# Patient Record
Sex: FEMALE | Race: WHITE | Employment: UNEMPLOYED | URBAN - METROPOLITAN AREA
[De-identification: names, ages, dates, MRNs, and addresses within clinical notes are randomized per-mention and may not be internally consistent; named-entity substitution may affect disease eponyms.]

---

## 2017-01-06 ENCOUNTER — TELEPHONE (OUTPATIENT)
Dept: FAMILY MEDICINE CLINIC | Age: 82
End: 2017-01-06

## 2017-01-06 DIAGNOSIS — E03.9 HYPOTHYROIDISM, UNSPECIFIED TYPE: ICD-10-CM

## 2017-01-06 DIAGNOSIS — E78.5 HYPERLIPIDEMIA, UNSPECIFIED HYPERLIPIDEMIA TYPE: Primary | ICD-10-CM

## 2017-01-11 DIAGNOSIS — E03.9 HYPOTHYROIDISM, UNSPECIFIED TYPE: ICD-10-CM

## 2017-01-11 DIAGNOSIS — E78.5 HYPERLIPIDEMIA, UNSPECIFIED HYPERLIPIDEMIA TYPE: ICD-10-CM

## 2017-01-11 LAB
A/G RATIO: 1.1 (ref 1.1–2.2)
ALBUMIN SERPL-MCNC: 3.9 G/DL (ref 3.4–5)
ALP BLD-CCNC: 92 U/L (ref 40–129)
ALT SERPL-CCNC: 15 U/L (ref 10–40)
ANION GAP SERPL CALCULATED.3IONS-SCNC: 13 MMOL/L (ref 3–16)
AST SERPL-CCNC: 20 U/L (ref 15–37)
BILIRUB SERPL-MCNC: 0.4 MG/DL (ref 0–1)
BUN BLDV-MCNC: 14 MG/DL (ref 7–20)
CALCIUM SERPL-MCNC: 9.7 MG/DL (ref 8.3–10.6)
CHLORIDE BLD-SCNC: 105 MMOL/L (ref 99–110)
CHOLESTEROL, TOTAL: 164 MG/DL (ref 0–199)
CO2: 27 MMOL/L (ref 21–32)
CREAT SERPL-MCNC: 0.6 MG/DL (ref 0.6–1.2)
GFR AFRICAN AMERICAN: >60
GFR NON-AFRICAN AMERICAN: >60
GLOBULIN: 3.7 G/DL
GLUCOSE BLD-MCNC: 89 MG/DL (ref 70–99)
HDLC SERPL-MCNC: 64 MG/DL (ref 40–60)
LDL CHOLESTEROL CALCULATED: 79 MG/DL
POTASSIUM SERPL-SCNC: 4 MMOL/L (ref 3.5–5.1)
SODIUM BLD-SCNC: 145 MMOL/L (ref 136–145)
TOTAL PROTEIN: 7.6 G/DL (ref 6.4–8.2)
TRIGL SERPL-MCNC: 105 MG/DL (ref 0–150)
TSH REFLEX: 0.93 UIU/ML (ref 0.27–4.2)
VLDLC SERPL CALC-MCNC: 21 MG/DL

## 2017-01-19 ENCOUNTER — OFFICE VISIT (OUTPATIENT)
Dept: FAMILY MEDICINE CLINIC | Age: 82
End: 2017-01-19

## 2017-01-19 VITALS
HEIGHT: 63 IN | WEIGHT: 156.6 LBS | TEMPERATURE: 97.7 F | BODY MASS INDEX: 27.75 KG/M2 | SYSTOLIC BLOOD PRESSURE: 118 MMHG | HEART RATE: 76 BPM | DIASTOLIC BLOOD PRESSURE: 80 MMHG

## 2017-01-19 DIAGNOSIS — E03.9 ACQUIRED HYPOTHYROIDISM: ICD-10-CM

## 2017-01-19 DIAGNOSIS — E78.2 MIXED HYPERLIPIDEMIA: ICD-10-CM

## 2017-01-19 DIAGNOSIS — I10 ESSENTIAL HYPERTENSION: Primary | ICD-10-CM

## 2017-01-19 PROCEDURE — G8427 DOCREV CUR MEDS BY ELIG CLIN: HCPCS | Performed by: FAMILY MEDICINE

## 2017-01-19 PROCEDURE — 4040F PNEUMOC VAC/ADMIN/RCVD: CPT | Performed by: FAMILY MEDICINE

## 2017-01-19 PROCEDURE — G8420 CALC BMI NORM PARAMETERS: HCPCS | Performed by: FAMILY MEDICINE

## 2017-01-19 PROCEDURE — 99214 OFFICE O/P EST MOD 30 MIN: CPT | Performed by: FAMILY MEDICINE

## 2017-01-19 PROCEDURE — 1090F PRES/ABSN URINE INCON ASSESS: CPT | Performed by: FAMILY MEDICINE

## 2017-01-19 PROCEDURE — G8484 FLU IMMUNIZE NO ADMIN: HCPCS | Performed by: FAMILY MEDICINE

## 2017-01-19 PROCEDURE — 1036F TOBACCO NON-USER: CPT | Performed by: FAMILY MEDICINE

## 2017-01-19 PROCEDURE — 1123F ACP DISCUSS/DSCN MKR DOCD: CPT | Performed by: FAMILY MEDICINE

## 2017-01-19 RX ORDER — HYDROCHLOROTHIAZIDE 25 MG/1
TABLET ORAL
Qty: 90 TABLET | Refills: 3 | Status: SHIPPED | OUTPATIENT
Start: 2017-01-19 | End: 2017-07-27 | Stop reason: SDUPTHER

## 2017-01-19 RX ORDER — AMLODIPINE BESYLATE 5 MG/1
TABLET ORAL
Qty: 90 TABLET | Refills: 3 | Status: SHIPPED | OUTPATIENT
Start: 2017-01-19 | End: 2017-07-27 | Stop reason: SDUPTHER

## 2017-01-19 RX ORDER — LEVOTHYROXINE SODIUM 0.1 MG/1
TABLET ORAL
Qty: 90 TABLET | Refills: 3 | Status: SHIPPED | OUTPATIENT
Start: 2017-01-19 | End: 2017-07-27 | Stop reason: SDUPTHER

## 2017-01-19 RX ORDER — SIMVASTATIN 20 MG
TABLET ORAL
Qty: 90 TABLET | Refills: 3 | Status: SHIPPED | OUTPATIENT
Start: 2017-01-19 | End: 2018-02-09 | Stop reason: SDUPTHER

## 2017-01-19 ASSESSMENT — ENCOUNTER SYMPTOMS
EYE PAIN: 0
NAUSEA: 0
SHORTNESS OF BREATH: 0
VOMITING: 0
ABDOMINAL PAIN: 0
BLOOD IN STOOL: 0
RHINORRHEA: 0
DIARRHEA: 0
EYE DISCHARGE: 0
SINUS PRESSURE: 0
COLOR CHANGE: 0
CONSTIPATION: 0
COUGH: 0
CHEST TIGHTNESS: 0
EYE REDNESS: 0
WHEEZING: 0

## 2017-01-19 ASSESSMENT — PATIENT HEALTH QUESTIONNAIRE - PHQ9
SUM OF ALL RESPONSES TO PHQ QUESTIONS 1-9: 0
SUM OF ALL RESPONSES TO PHQ9 QUESTIONS 1 & 2: 0
2. FEELING DOWN, DEPRESSED OR HOPELESS: 0
1. LITTLE INTEREST OR PLEASURE IN DOING THINGS: 0

## 2017-02-08 ENCOUNTER — NURSE ONLY (OUTPATIENT)
Dept: FAMILY MEDICINE CLINIC | Age: 82
End: 2017-02-08

## 2017-02-08 VITALS — WEIGHT: 157 LBS | BODY MASS INDEX: 27.81 KG/M2

## 2017-02-08 DIAGNOSIS — R63.4 WEIGHT LOSS: Primary | ICD-10-CM

## 2017-02-08 PROCEDURE — 99999 PR OFFICE/OUTPT VISIT,PROCEDURE ONLY: CPT | Performed by: FAMILY MEDICINE

## 2017-02-08 PROCEDURE — 2001F WEIGHT RECORD: CPT | Performed by: FAMILY MEDICINE

## 2017-02-27 ENCOUNTER — NURSE ONLY (OUTPATIENT)
Dept: FAMILY MEDICINE CLINIC | Age: 82
End: 2017-02-27

## 2017-02-27 VITALS — WEIGHT: 158.6 LBS | BODY MASS INDEX: 28.09 KG/M2

## 2017-02-27 DIAGNOSIS — R63.4 WEIGHT LOSS: Primary | ICD-10-CM

## 2017-02-27 PROCEDURE — 99999 PR OFFICE/OUTPT VISIT,PROCEDURE ONLY: CPT | Performed by: FAMILY MEDICINE

## 2017-02-27 PROCEDURE — 2001F WEIGHT RECORD: CPT | Performed by: FAMILY MEDICINE

## 2017-03-03 ENCOUNTER — TELEPHONE (OUTPATIENT)
Dept: FAMILY MEDICINE CLINIC | Age: 82
End: 2017-03-03

## 2017-04-10 RX ORDER — HYDROCHLOROTHIAZIDE 25 MG/1
TABLET ORAL
Qty: 90 TABLET | Refills: 3 | Status: SHIPPED | OUTPATIENT
Start: 2017-04-10 | End: 2018-06-16 | Stop reason: SDUPTHER

## 2017-05-30 ENCOUNTER — TELEPHONE (OUTPATIENT)
Dept: FAMILY MEDICINE CLINIC | Age: 82
End: 2017-05-30

## 2017-05-30 DIAGNOSIS — B35.1 NAIL FUNGUS: Primary | ICD-10-CM

## 2017-07-12 ENCOUNTER — TELEPHONE (OUTPATIENT)
Dept: FAMILY MEDICINE CLINIC | Age: 82
End: 2017-07-12

## 2017-07-14 RX ORDER — AMLODIPINE BESYLATE 5 MG/1
TABLET ORAL
Qty: 90 TABLET | Refills: 3 | Status: SHIPPED | OUTPATIENT
Start: 2017-07-14 | End: 2018-09-19 | Stop reason: SDUPTHER

## 2017-07-14 RX ORDER — LEVOTHYROXINE SODIUM 0.1 MG/1
TABLET ORAL
Qty: 90 TABLET | Refills: 3 | Status: SHIPPED | OUTPATIENT
Start: 2017-07-14 | End: 2018-06-16 | Stop reason: SDUPTHER

## 2017-07-24 ENCOUNTER — TELEPHONE (OUTPATIENT)
Dept: FAMILY MEDICINE CLINIC | Age: 82
End: 2017-07-24

## 2017-07-24 DIAGNOSIS — E78.2 MIXED HYPERLIPIDEMIA: ICD-10-CM

## 2017-07-24 DIAGNOSIS — Z85.3 HISTORY OF BREAST CANCER: Primary | ICD-10-CM

## 2017-07-24 LAB
A/G RATIO: 1.1 (ref 1.1–2.2)
ALBUMIN SERPL-MCNC: 3.9 G/DL (ref 3.4–5)
ALP BLD-CCNC: 97 U/L (ref 40–129)
ALT SERPL-CCNC: 14 U/L (ref 10–40)
ANION GAP SERPL CALCULATED.3IONS-SCNC: 13 MMOL/L (ref 3–16)
AST SERPL-CCNC: 20 U/L (ref 15–37)
BILIRUB SERPL-MCNC: <0.2 MG/DL (ref 0–1)
BUN BLDV-MCNC: 13 MG/DL (ref 7–20)
CALCIUM SERPL-MCNC: 9.9 MG/DL (ref 8.3–10.6)
CHLORIDE BLD-SCNC: 102 MMOL/L (ref 99–110)
CHOLESTEROL, TOTAL: 143 MG/DL (ref 0–199)
CO2: 26 MMOL/L (ref 21–32)
CREAT SERPL-MCNC: 0.5 MG/DL (ref 0.6–1.2)
GFR AFRICAN AMERICAN: >60
GFR NON-AFRICAN AMERICAN: >60
GLOBULIN: 3.6 G/DL
GLUCOSE BLD-MCNC: 91 MG/DL (ref 70–99)
HDLC SERPL-MCNC: 63 MG/DL (ref 40–60)
LDL CHOLESTEROL CALCULATED: 62 MG/DL
POTASSIUM SERPL-SCNC: 3.9 MMOL/L (ref 3.5–5.1)
SODIUM BLD-SCNC: 141 MMOL/L (ref 136–145)
TOTAL PROTEIN: 7.5 G/DL (ref 6.4–8.2)
TRIGL SERPL-MCNC: 88 MG/DL (ref 0–150)
VLDLC SERPL CALC-MCNC: 18 MG/DL

## 2017-07-27 ENCOUNTER — OFFICE VISIT (OUTPATIENT)
Dept: FAMILY MEDICINE CLINIC | Age: 82
End: 2017-07-27

## 2017-07-27 VITALS
DIASTOLIC BLOOD PRESSURE: 84 MMHG | BODY MASS INDEX: 28.49 KG/M2 | SYSTOLIC BLOOD PRESSURE: 124 MMHG | TEMPERATURE: 97.7 F | HEART RATE: 76 BPM | WEIGHT: 160.8 LBS | HEIGHT: 63 IN

## 2017-07-27 DIAGNOSIS — E78.2 MIXED HYPERLIPIDEMIA: ICD-10-CM

## 2017-07-27 DIAGNOSIS — I10 ESSENTIAL HYPERTENSION: Primary | ICD-10-CM

## 2017-07-27 DIAGNOSIS — E55.9 VITAMIN D DEFICIENCY: ICD-10-CM

## 2017-07-27 DIAGNOSIS — E03.9 ACQUIRED HYPOTHYROIDISM: ICD-10-CM

## 2017-07-27 PROCEDURE — 1090F PRES/ABSN URINE INCON ASSESS: CPT | Performed by: FAMILY MEDICINE

## 2017-07-27 PROCEDURE — 99213 OFFICE O/P EST LOW 20 MIN: CPT | Performed by: FAMILY MEDICINE

## 2017-07-27 PROCEDURE — 4040F PNEUMOC VAC/ADMIN/RCVD: CPT | Performed by: FAMILY MEDICINE

## 2017-07-27 PROCEDURE — G8419 CALC BMI OUT NRM PARAM NOF/U: HCPCS | Performed by: FAMILY MEDICINE

## 2017-07-27 PROCEDURE — 1036F TOBACCO NON-USER: CPT | Performed by: FAMILY MEDICINE

## 2017-07-27 PROCEDURE — 1123F ACP DISCUSS/DSCN MKR DOCD: CPT | Performed by: FAMILY MEDICINE

## 2017-07-27 PROCEDURE — G8427 DOCREV CUR MEDS BY ELIG CLIN: HCPCS | Performed by: FAMILY MEDICINE

## 2017-07-27 ASSESSMENT — ENCOUNTER SYMPTOMS
EYE PAIN: 0
CHEST TIGHTNESS: 0
EYE REDNESS: 0
WHEEZING: 0
SHORTNESS OF BREATH: 0
NAUSEA: 0
RHINORRHEA: 0
COUGH: 0
VOMITING: 0
CONSTIPATION: 0
COLOR CHANGE: 0
EYE DISCHARGE: 0
BLOOD IN STOOL: 0
DIARRHEA: 0
ABDOMINAL PAIN: 0
SINUS PRESSURE: 0

## 2017-07-31 ENCOUNTER — HOSPITAL ENCOUNTER (OUTPATIENT)
Dept: WOMENS IMAGING | Age: 82
Discharge: HOME | End: 2017-07-31
Attending: FAMILY MEDICINE | Admitting: FAMILY MEDICINE

## 2017-07-31 DIAGNOSIS — Z85.3 HISTORY OF BREAST CANCER: ICD-10-CM

## 2017-07-31 DIAGNOSIS — Z85.3 PERSONAL HISTORY OF MALIGNANT NEOPLASM OF BREAST: ICD-10-CM

## 2017-08-01 DIAGNOSIS — Z12.31 VISIT FOR SCREENING MAMMOGRAM: Primary | ICD-10-CM

## 2017-11-13 ENCOUNTER — OFFICE VISIT (OUTPATIENT)
Dept: FAMILY MEDICINE CLINIC | Age: 82
End: 2017-11-13

## 2017-11-13 ENCOUNTER — HOSPITAL ENCOUNTER (OUTPATIENT)
Dept: NON INVASIVE DIAGNOSTICS | Age: 82
Discharge: HOME | End: 2017-11-13
Attending: FAMILY MEDICINE | Admitting: FAMILY MEDICINE

## 2017-11-13 VITALS
BODY MASS INDEX: 28.14 KG/M2 | TEMPERATURE: 97.8 F | DIASTOLIC BLOOD PRESSURE: 82 MMHG | SYSTOLIC BLOOD PRESSURE: 128 MMHG | HEIGHT: 63 IN | HEART RATE: 64 BPM | WEIGHT: 158.8 LBS

## 2017-11-13 DIAGNOSIS — R06.02 SOB (SHORTNESS OF BREATH): ICD-10-CM

## 2017-11-13 DIAGNOSIS — R68.83 CHILLS: ICD-10-CM

## 2017-11-13 DIAGNOSIS — R42 DIZZINESS: ICD-10-CM

## 2017-11-13 DIAGNOSIS — R42 LIGHTHEADEDNESS: Primary | ICD-10-CM

## 2017-11-13 PROCEDURE — 4040F PNEUMOC VAC/ADMIN/RCVD: CPT | Performed by: FAMILY MEDICINE

## 2017-11-13 PROCEDURE — 93000 ELECTROCARDIOGRAM COMPLETE: CPT | Performed by: FAMILY MEDICINE

## 2017-11-13 PROCEDURE — 1123F ACP DISCUSS/DSCN MKR DOCD: CPT | Performed by: FAMILY MEDICINE

## 2017-11-13 PROCEDURE — G8484 FLU IMMUNIZE NO ADMIN: HCPCS | Performed by: FAMILY MEDICINE

## 2017-11-13 PROCEDURE — G8417 CALC BMI ABV UP PARAM F/U: HCPCS | Performed by: FAMILY MEDICINE

## 2017-11-13 PROCEDURE — 1036F TOBACCO NON-USER: CPT | Performed by: FAMILY MEDICINE

## 2017-11-13 PROCEDURE — 1090F PRES/ABSN URINE INCON ASSESS: CPT | Performed by: FAMILY MEDICINE

## 2017-11-13 PROCEDURE — 99213 OFFICE O/P EST LOW 20 MIN: CPT | Performed by: FAMILY MEDICINE

## 2017-11-13 PROCEDURE — G8427 DOCREV CUR MEDS BY ELIG CLIN: HCPCS | Performed by: FAMILY MEDICINE

## 2017-11-13 ASSESSMENT — ENCOUNTER SYMPTOMS
BLOOD IN STOOL: 0
RHINORRHEA: 0
DIARRHEA: 0
EYE PAIN: 0
EYE REDNESS: 0
WHEEZING: 0
ABDOMINAL PAIN: 0
NAUSEA: 0
SINUS PAIN: 0
COUGH: 0
VOMITING: 0
SORE THROAT: 0
SHORTNESS OF BREATH: 0

## 2017-11-13 NOTE — PROGRESS NOTES
Subjective:      Patient ID: Guy Burnette is a 80 y.o. female. HPI     Chief Complaint   Patient presents with    Dizziness     dizziness intermitted x 2 days. states she has inner ear issues. Patient presents c/o decreased appetite x 6 days. States that last Tuesday (11/7/17), she had spicy chicken tortilla soup, and a few hours later \"felt funny\". States she experienced some chills and her  states that she was breathing quicker while sleeping. States she felt fine that Wednesday but then experienced more chills that Thursday night. States she has been eating crackers, toast, orange juice and chocolate milk. States she felt hungry this morning and ate eggs, a croissant, and orange juice. Denies fever, rhinorrhea, congestion, ear pain, eye pain, abdominal pain, N/V/D, chest pain, palpitations, SOB, or wheezing. Patient also states she has had intermittent lightheaded x 6 days. Aggravated with movement and alleviated with rest. States she will feel \"woozy\" but that she feels better when she closes her eyes. Denies blurry vision, dizziness with head movement, syncope, or feeling off balance. Patient states that for the past few months, \"things don't taste like they should\".     Guy Burnette is a 80 y.o. female with the following history as recorded in [facility]:  Patient Active Problem List    Diagnosis Date Noted    History of breast cancer 06/06/2016    Sacral insufficiency fracture 05/18/2013    Shortness of breath 06/08/2012    Abnormal EKG     Atrial arrhythmia     Cataract 06/07/2012    Arrhythmia, atrial 06/07/2012    HTN (hypertension) 05/15/2012    Hypothyroid 11/17/2011    HLD (hyperlipidemia) 11/17/2011    COPD (chronic obstructive pulmonary disease) ([facility] [address] 11/17/2011    Osteopenia 11/17/2011    Abnormal mammogram 11/17/2011     Current Outpatient Prescriptions   Medication Sig Dispense Refill    Umeclidinium Bromide (INCRUSE ELLIPTA) 62.5 MCG/INH AEPB Inhale 1 puff into the lungs daily 3 each 3    levothyroxine (SYNTHROID) 100 MCG tablet TAKE 1 TABLET DAILY 90 tablet 3    amLODIPine (NORVASC) 5 MG tablet TAKE 1 TABLET DAILY 90 tablet 3    hydrochlorothiazide (HYDRODIURIL) 25 MG tablet TAKE 1 TABLET DAILY 90 tablet 3    simvastatin (ZOCOR) 20 MG tablet TAKE 1 TABLET EVERY EVENING 90 tablet 3    aspirin 81 MG tablet Take 81 mg by mouth daily      Ascorbic Acid (VITAMIN C) 500 MG tablet Take 500 mg by mouth daily      Multiple Minerals-Vitamins (CALCIUM & VIT D3 BONE HEALTH PO) Take by mouth daily      Multiple Vitamins-Minerals (MULTIVITAL PO) Take  by mouth. No current facility-administered medications for this visit. Allergies: Review of patient's allergies indicates no known allergies. Past Medical History:   Diagnosis Date    Abnormal EKG     Atrial arrhythmia     Echo 09 LVEF 65-70%    Breast cancer (HCC)     s/p mastectomy    COPD (chronic obstructive pulmonary disease) (HCC)     Hyperlipidemia     managed by PCP    Hypothyroidism     managed by PCP    Preop cardiovascular exam     prior to having cataract surgery     Past Surgical History:   Procedure Laterality Date    MASTECTOMY      OVARY REMOVAL      TONSILLECTOMY AND ADENOIDECTOMY       Family History   Problem Relation Age of Onset    Diabetes Sister     Heart Disease Sister     Heart Disease Brother      Social History   Substance Use Topics    Smoking status: Former Smoker    Smokeless tobacco: Never Used    Alcohol use Yes      Comment: occ     Vitals:    11/13/17 1306   BP: 128/82   Pulse: 64   Temp: 97.8 °F (36.6 °C)   TempSrc: Oral   Weight: 158 lb 12.8 oz (72 kg)   Height: 5' 3\" (1.6 m)     Body mass index is 28.13 kg/m².      Wt Readings from Last 3 Encounters:   11/13/17 158 lb 12.8 oz (72 kg)   07/27/17 160 lb 12.8 oz (72.9 kg)   02/27/17 158 lb 9.6 oz (71.9 kg)     BP Readings from Last 3 Encounters:   11/13/17 128/82   07/27/17 124/84   01/19/17 118/80 Review of Systems   Constitutional: Positive for appetite change. Negative for chills and fever. HENT: Negative for congestion, ear pain, rhinorrhea, sinus pain and sore throat. Eyes: Negative for pain and redness. Respiratory: Negative for cough, shortness of breath and wheezing. Cardiovascular: Negative for chest pain and palpitations. Gastrointestinal: Negative for abdominal pain, blood in stool, diarrhea, nausea and vomiting. Skin: Negative for rash. Neurological: Positive for dizziness and light-headedness. Negative for seizures, syncope, weakness and headaches. Objective:   Physical Exam   Constitutional: She is oriented to person, place, and time. She appears well-developed and well-nourished. HENT:   Head: Normocephalic and atraumatic. Cardiovascular: Normal rate and regular rhythm. Pulmonary/Chest: Effort normal and breath sounds normal. She has no wheezes. Abdominal: Soft. Bowel sounds are normal. She exhibits no distension. There is no tenderness. There is no rebound and no guarding. Neurological: She is alert and oriented to person, place, and time. Skin: Skin is warm and dry. No rash noted. Psychiatric: She has a normal mood and affect.  Her behavior is normal. Judgment and thought content normal.     EKG: sinus rhythm with occasional ectopic ventricular beat    Assessment:      Lightheaded/dizziness - EKG showed sinus rhythm with occasional ectopic ventricular beat, will get MRI of brain  Chills  SOB - XR of chest      Plan:         Orders Placed This Encounter   Procedures    MRI Brain W WO Contrast     Standing Status:   Future     Standing Expiration Date:   11/13/2018     Order Specific Question:   Reason for exam:     Answer:   DIZZINESS X MONTHS    XR CHEST STANDARD (2 VW)     Standing Status:   Future     Standing Expiration Date:   11/13/2018     Order Specific Question:   Reason for exam:     Answer:   SOB, chills    EKG 12 Lead     Standing Status: Future     Number of Occurrences:   1     Standing Expiration Date:   11/13/2018     Order Specific Question:   Reason for Exam?     Answer:   Dizziness     No orders of the defined types were placed in this encounter.

## 2017-11-15 DIAGNOSIS — R93.89 ABNORMAL CHEST X-RAY: ICD-10-CM

## 2017-11-15 DIAGNOSIS — R06.02 SOB (SHORTNESS OF BREATH): Primary | ICD-10-CM

## 2017-11-16 ENCOUNTER — HOSPITAL ENCOUNTER (OUTPATIENT)
Dept: MRI IMAGING | Age: 82
Discharge: HOME | End: 2017-11-16
Attending: FAMILY MEDICINE | Admitting: FAMILY MEDICINE

## 2017-11-16 DIAGNOSIS — R42 DIZZINESS: ICD-10-CM

## 2017-11-16 DIAGNOSIS — Z85.3 PERSONAL HISTORY OF MALIGNANT NEOPLASM OF BREAST: ICD-10-CM

## 2017-11-17 ENCOUNTER — HOSPITAL ENCOUNTER (OUTPATIENT)
Dept: CT IMAGING | Age: 82
Discharge: HOME | End: 2017-11-17
Admitting: FAMILY MEDICINE

## 2017-11-17 ENCOUNTER — HOSPITAL ENCOUNTER (OUTPATIENT)
Dept: CT IMAGING | Age: 82
Discharge: HOME | End: 2017-11-17
Attending: FAMILY MEDICINE | Admitting: FAMILY MEDICINE

## 2017-11-17 DIAGNOSIS — R06.02 SHORTNESS OF BREATH: ICD-10-CM

## 2017-11-17 DIAGNOSIS — R93.89 ABNORMAL CHEST X-RAY: ICD-10-CM

## 2017-11-17 DIAGNOSIS — I65.01 STENOSIS OF RIGHT VERTEBRAL ARTERY: Primary | ICD-10-CM

## 2017-11-17 DIAGNOSIS — R06.02 SOB (SHORTNESS OF BREATH): ICD-10-CM

## 2017-11-17 DIAGNOSIS — Z85.3 PERSONAL HISTORY OF MALIGNANT NEOPLASM OF BREAST: ICD-10-CM

## 2017-11-17 DIAGNOSIS — I65.01 STENOSIS OF RIGHT VERTEBRAL ARTERY: ICD-10-CM

## 2017-11-17 LAB
CREAT SERPL-MCNC: 0.5 MG/DL (ref 0.6–1.2)
GFR AFRICAN AMERICAN: >60
GFR NON-AFRICAN AMERICAN: >60

## 2017-11-20 ENCOUNTER — TELEPHONE (OUTPATIENT)
Dept: FAMILY MEDICINE CLINIC | Age: 82
End: 2017-11-20

## 2017-11-27 ENCOUNTER — TELEPHONE (OUTPATIENT)
Dept: FAMILY MEDICINE CLINIC | Age: 82
End: 2017-11-27

## 2017-11-27 DIAGNOSIS — J84.10 PULMONARY FIBROSIS (HCC): ICD-10-CM

## 2017-11-27 DIAGNOSIS — T81.82XA SUBCUTANEOUS EMPHYSEMA RESULTING FROM A PROCEDURE, INITIAL ENCOUNTER: Primary | ICD-10-CM

## 2017-11-27 DIAGNOSIS — R91.8 LUNG NODULES: ICD-10-CM

## 2017-11-27 NOTE — TELEPHONE ENCOUNTER
Pt calling to find out if she is suppose to have another CT done and what the number is she is suppose to call        [phone]

## 2017-11-27 NOTE — TELEPHONE ENCOUNTER
She is supposed to see pulmonology about her chest ct results  What test did she think needed repeated?   Also advise of my leaving

## 2017-11-28 ENCOUNTER — TELEPHONE (OUTPATIENT)
Dept: PULMONOLOGY | Age: 82
End: 2017-11-28

## 2017-11-28 NOTE — TELEPHONE ENCOUNTER
Pt did call pulmonology to schedule but they needed a referral. Referral placed, advised TB transferring and ok for her and her  to f/u with Dr. Belinda Gonzalez.

## 2017-11-29 NOTE — TELEPHONE ENCOUNTER
MMA:  Offer appointment this week. [facility] Francis RICHARDS, DO. Interesting CT. Looks like swyer gege syndrome. Will explain with c/s note.

## 2017-12-01 ENCOUNTER — OFFICE VISIT (OUTPATIENT)
Dept: PULMONOLOGY | Age: 82
End: 2017-12-01

## 2017-12-01 VITALS
OXYGEN SATURATION: 90 % | HEIGHT: 63 IN | HEART RATE: 77 BPM | BODY MASS INDEX: 27.89 KG/M2 | RESPIRATION RATE: 16 BRPM | DIASTOLIC BLOOD PRESSURE: 73 MMHG | WEIGHT: 157.4 LBS | SYSTOLIC BLOOD PRESSURE: 132 MMHG | TEMPERATURE: 97.8 F

## 2017-12-01 DIAGNOSIS — E03.9 ACQUIRED HYPOTHYROIDISM: ICD-10-CM

## 2017-12-01 DIAGNOSIS — R06.02 SOB (SHORTNESS OF BREATH): ICD-10-CM

## 2017-12-01 DIAGNOSIS — J43.0 SWYER-JAMES SYNDROME (HCC): ICD-10-CM

## 2017-12-01 DIAGNOSIS — I10 ESSENTIAL HYPERTENSION: ICD-10-CM

## 2017-12-01 DIAGNOSIS — M19.90 ARTHRITIS: ICD-10-CM

## 2017-12-01 DIAGNOSIS — J84.10 PULMONARY FIBROSIS (HCC): Primary | ICD-10-CM

## 2017-12-01 DIAGNOSIS — E55.9 VITAMIN D DEFICIENCY: ICD-10-CM

## 2017-12-01 DIAGNOSIS — E78.2 MIXED HYPERLIPIDEMIA: ICD-10-CM

## 2017-12-01 LAB
A/G RATIO: 1.1 (ref 1.1–2.2)
ALBUMIN SERPL-MCNC: 4 G/DL (ref 3.4–5)
ALP BLD-CCNC: 145 U/L (ref 40–129)
ALT SERPL-CCNC: 20 U/L (ref 10–40)
ANION GAP SERPL CALCULATED.3IONS-SCNC: 14 MMOL/L (ref 3–16)
AST SERPL-CCNC: 26 U/L (ref 15–37)
BILIRUB SERPL-MCNC: 0.8 MG/DL (ref 0–1)
BUN BLDV-MCNC: 11 MG/DL (ref 7–20)
CALCIUM SERPL-MCNC: 9.9 MG/DL (ref 8.3–10.6)
CHLORIDE BLD-SCNC: 102 MMOL/L (ref 99–110)
CHOLESTEROL, TOTAL: 153 MG/DL (ref 0–199)
CO2: 27 MMOL/L (ref 21–32)
CREAT SERPL-MCNC: 0.5 MG/DL (ref 0.6–1.2)
GFR AFRICAN AMERICAN: >60
GFR NON-AFRICAN AMERICAN: >60
GLOBULIN: 3.7 G/DL
GLUCOSE BLD-MCNC: 93 MG/DL (ref 70–99)
HDLC SERPL-MCNC: 70 MG/DL (ref 40–60)
LDL CHOLESTEROL CALCULATED: 62 MG/DL
POTASSIUM SERPL-SCNC: 3.6 MMOL/L (ref 3.5–5.1)
RHEUMATOID FACTOR: 36 IU/ML
SODIUM BLD-SCNC: 143 MMOL/L (ref 136–145)
TOTAL PROTEIN: 7.7 G/DL (ref 6.4–8.2)
TRIGL SERPL-MCNC: 106 MG/DL (ref 0–150)
TSH REFLEX: 1.73 UIU/ML (ref 0.27–4.2)
VITAMIN D 25-HYDROXY: 51.3 NG/ML
VLDLC SERPL CALC-MCNC: 21 MG/DL

## 2017-12-01 PROCEDURE — G8417 CALC BMI ABV UP PARAM F/U: HCPCS | Performed by: INTERNAL MEDICINE

## 2017-12-01 PROCEDURE — 99205 OFFICE O/P NEW HI 60 MIN: CPT | Performed by: INTERNAL MEDICINE

## 2017-12-01 PROCEDURE — G8598 ASA/ANTIPLAT THER USED: HCPCS | Performed by: INTERNAL MEDICINE

## 2017-12-01 PROCEDURE — 1090F PRES/ABSN URINE INCON ASSESS: CPT | Performed by: INTERNAL MEDICINE

## 2017-12-01 PROCEDURE — G8427 DOCREV CUR MEDS BY ELIG CLIN: HCPCS | Performed by: INTERNAL MEDICINE

## 2017-12-01 PROCEDURE — 1036F TOBACCO NON-USER: CPT | Performed by: INTERNAL MEDICINE

## 2017-12-01 PROCEDURE — 3023F SPIROM DOC REV: CPT | Performed by: INTERNAL MEDICINE

## 2017-12-01 PROCEDURE — G8926 SPIRO NO PERF OR DOC: HCPCS | Performed by: INTERNAL MEDICINE

## 2017-12-01 PROCEDURE — G8484 FLU IMMUNIZE NO ADMIN: HCPCS | Performed by: INTERNAL MEDICINE

## 2017-12-01 PROCEDURE — 1123F ACP DISCUSS/DSCN MKR DOCD: CPT | Performed by: INTERNAL MEDICINE

## 2017-12-01 PROCEDURE — 4040F PNEUMOC VAC/ADMIN/RCVD: CPT | Performed by: INTERNAL MEDICINE

## 2017-12-01 NOTE — PROGRESS NOTES
REASON FOR CONSULTATION/CC:   Chief Complaint   Patient presents with    Abnormal CT     11/17/17    Shortness of Breath        Consult at request of  Love RICHARDS DO for shortness of breath     PCP: Love RICHARDS DO    HISTORY OF PRESENT ILLNESS: Lucas Ball is a 80y.o. year old female with a history of htn  who presents :       Here for CT chest result. She states she was breathing shallow at night. No other new symptoms. She had chronic dyspnea on exertion that has been stable. Using inhaler but does not notice a change in dyspnea on exertion. Hx breast cancer 52 years ago. Does not think she received chemo. No hx blood clots or bleeding issues. Home. No hot tub or animals. Work. No exposures. Arthritis. Better through the day. PAST MEDICAL HISTORY:  Past Medical History:   Diagnosis Date    Abnormal EKG     Atrial arrhythmia     Echo 09 LVEF 65-70%    Breast cancer (HCC)     s/p mastectomy    [facility] obstructive [facility] disease[facility]     Hyperlipidemia     managed by PCP    Hypothyroidism     managed by PCP    Preop cardiovascular exam     prior to having cataract surgery       PAST SURGICAL HISTORY:  Past Surgical History:   Procedure Laterality Date    MASTECTOMY      OVARY REMOVAL      TONSILLECTOMY AND ADENOIDECTOMY         FAMILY HISTORY:  family history includes Diabetes in her sister; Heart Disease in her brother and sister. SOCIAL HISTORY:   reports that she quit smoking about 25 years ago. She has a 20.00 pack-year smoking history. She has never used smokeless tobacco.      ALLERGIES:  Patient has No Known Allergies.     REVIEW OF SYSTEMS:  Constitutional: Negative for fever    HENT: Negative for sore throat  Eyes: Negative for redness   Respiratory: + for dyspnea, - cough  Cardiovascular: Negative for chest pain  Gastrointestinal: Negative for vomiting, diarrhea   Genitourinary: Negative for hematuria   Musculoskeletal: K 3.9 07/24/2017     07/24/2017    CO2 26 07/24/2017    CO2 27 01/11/2017    CO2 26 06/15/2016    BUN 13 07/24/2017    CREATININE 0.5 11/17/2017    GLUCOSE 91 07/24/2017    CALCIUM 9.9 07/24/2017       Last ABG  POC Blood Gas: No results found for: POCPH, POCPCO2, POCPO2, POCHCO3, NBEA, YGNS4WCL  No results for input(s): PH, PCO2, PO2, HCO3, BE, O2SAT in the last 72 hours. Chest imaging reports were reviewed and imaging was reviewed by me and showed   Radiology Review:  Pertinent images / reports were reviewed as a part of this visit. CT Chest w/ contrast: No results found for this or any previous visit. CT Chest w/o contrast:   Results for orders placed during the hospital encounter of 11/17/17   CT Chest WO Contrast    Narrative EXAMINATION:  CT OF THE CHEST WITHOUT CONTRAST 11/17/2017 7:24 am    TECHNIQUE:  CT of the chest was performed without the administration of intravenous  contrast. Multiplanar reformatted images are provided for review. Dose  modulation, iterative reconstruction, and/or weight based adjustment of the  mA/kV was utilized to reduce the radiation dose to as low as reasonably  achievable. COMPARISON:  None    HISTORY:  ORDERING PHYSICIAN PROVIDED HISTORY: SOB (shortness of breath)  TECHNOLOGIST PROVIDED HISTORY:  Technologist Provided Reason for Exam: pt has increased sob  Acuity: Acute  Type of Encounter: Subsequent/Follow-up  Relevant Medical/Surgical History: hx of left sided breast CA with  mastectomy, COPD, HTN    FINDINGS:  Mediastinum: There is no pathologic lymphadenopathy. There is  atherosclerotic disease of the thoracic aorta, without evidence of aneurysm. There is enlargement of the main and central pulmonary arteries, suggestive  of chronic pulmonary arterial hypertension. There is coronary  atherosclerosis. No pericardial abnormality is identified. Lungs/pleura: The central airways are patent. There is a background of  moderate emphysema.   There is Arthritis  She has morning stiffness that gets better through the day. Therefore, I am concern for RA. Work up below. - KRISTIAN  - Aldolase  - Rheumatoid Factor  - Sedimentation rate, manual  - Cyclic Citrul Peptide Antibody, IgG  - Anti-DNA Antibody, Double-Stranded  - CHARITY 2 - Anti SSA & Anti SSB       4.  SOB (shortness of breath)     - ECHO 2D WO Color Doppler Complete

## 2017-12-01 NOTE — PATIENT INSTRUCTIONS
Pulmonary fibrosis. I do not think it is Idiopathic pulmonary fibrosis (IPF)   Working up Rheumatoid arthritis.

## 2017-12-01 NOTE — LETTER
[facility] [address] [facility] [place]  Phone: [phone]  Fax: [phone], [address] [facility],         December 1, 2017       Patient: Malvin Doll   MR Number: [id number]   YOB: 1928   Date of Visit: 12/1/2017       Dear Dr. Ruth Soler:    Thank you for the request for consultation for Xavi Chin to me for the evaluation of shortness of breath . Below are the relevant portions of my assessment and plan of care. 1. 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 CT chest demonstrates moderate diffuse fibrosis consistent with Interstitial Lung Disease  (ILD) but not specific pattern. No Ground-glass opacities and not consistent with Idiopathic pulmonary fibrosis (IPF). With symptoms consistent with rheumatoid arthritis, this is possible. In addition, right middle chest and area of the left upper chest appears to have lack of blood flow that could be consistent with swyer wilver vs chronic thromboembolic disease. I will get VQ scan to assess for this. Lastly, I will get Echo to assess dyspnea and possible pulmonary hypertension. - NM LUNG VENT/PERFUSION (VQ)  - FULL PFT STUDY WITH PRE AND POST    2. Arthritis  She has morning stiffness that gets better through the day. Therefore, I am concern for RA. Work up below. - KRISTIAN  - Aldolase  - Rheumatoid Factor  - Sedimentation rate, manual  - Cyclic Citrul Peptide Antibody, IgG  - Anti-DNA Antibody, Double-Stranded  - CHARITY 2 - Anti SSA & Anti SSB       4. SOB (shortness of breath)     - ECHO 2D WO Color Doppler Complete      If you have questions, please do not hesitate to call me. I look forward to following Chinmay Hernandez along with you.     Sincerely,        Wai Brewer MD    CC providers:  Donna Mcardle, [address] [place] [address]  VIA In Basket

## 2017-12-04 LAB
ALDOLASE: 1.6 U/L (ref 1.5–8.1)
ANA INTERPRETATION: NORMAL
ANTI-NUCLEAR ANTIBODY (ANA): NEGATIVE
ENA TO SSA (RO) ANTIBODY: NEGATIVE EU
ENA TO SSB (LA) ANTIBODY: NEGATIVE EU

## 2017-12-05 LAB
CCP IGG ANTIBODIES: 5 UNITS (ref 0–19)
DOUBLE STRANDED DNA AB, IGG: NORMAL

## 2017-12-11 ENCOUNTER — HOSPITAL ENCOUNTER (OUTPATIENT)
Dept: NON INVASIVE DIAGNOSTICS | Age: 82
Discharge: HOME | End: 2017-12-11
Attending: INTERNAL MEDICINE | Admitting: INTERNAL MEDICINE

## 2017-12-11 DIAGNOSIS — J84.10 PULMONARY FIBROSIS, UNSPECIFIED (HCC): ICD-10-CM

## 2017-12-11 DIAGNOSIS — J84.10 FIBROSIS OF LUNG (HCC): ICD-10-CM

## 2017-12-11 DIAGNOSIS — J43.0 SWYER-JAMES SYNDROME (HCC): ICD-10-CM

## 2017-12-11 DIAGNOSIS — Z85.3 PERSONAL HISTORY OF MALIGNANT NEOPLASM OF BREAST: ICD-10-CM

## 2017-12-11 LAB
DLCO %PRED: NORMAL
DLCO PRE: NORMAL
FEF 25-75%-POST: NORMAL
FEF 25-75%-PRE: NORMAL
FEV1-POST: NORMAL
FEV1-PRE: NORMAL
FEV1/FVC-POST: NORMAL
FEV1/FVC-PRE: NORMAL
FVC-POST: NORMAL
FVC-PRE: NORMAL
LV EF: 58 %
LVEF MODALITY: NORMAL
MEP: NORMAL
MIP: NORMAL
MVV %PRED-PRE: NORMAL
MVV-PRE: NORMAL
TLC %PRED: NORMAL
TLC PRE: NORMAL

## 2017-12-11 PROCEDURE — 94727 GAS DIL/WSHOT DETER LNG VOL: CPT | Performed by: INTERNAL MEDICINE

## 2017-12-11 PROCEDURE — 94060 EVALUATION OF WHEEZING: CPT | Performed by: INTERNAL MEDICINE

## 2017-12-11 PROCEDURE — 94729 DIFFUSING CAPACITY: CPT | Performed by: INTERNAL MEDICINE

## 2017-12-11 RX ORDER — ALBUTEROL SULFATE 90 UG/1
4 AEROSOL, METERED RESPIRATORY (INHALATION) ONCE
Status: COMPLETED | OUTPATIENT
Start: 2017-12-11 | End: 2017-12-11

## 2017-12-11 RX ADMIN — Medication 20 MILLICURIE: at 10:01

## 2017-12-11 RX ADMIN — ALBUTEROL SULFATE 4 PUFF: 90 AEROSOL, METERED RESPIRATORY (INHALATION) at 11:17

## 2017-12-11 RX ADMIN — Medication 6 MILLICURIE: at 10:01

## 2017-12-19 ENCOUNTER — TELEPHONE (OUTPATIENT)
Dept: FAMILY MEDICINE CLINIC | Age: 82
End: 2017-12-19

## 2017-12-19 NOTE — TELEPHONE ENCOUNTER
[phone] - Left msg for Diana Castro to return call.   (needs to call 2 weeks prior to appt in June for fasting lab orders)

## 2017-12-19 NOTE — TELEPHONE ENCOUNTER
Pt is scheduled for a 6 mo f/u on 6/19 and would like to have bw prior to the appt. Pls advise when orders are placed.

## 2018-01-05 ENCOUNTER — OFFICE VISIT (OUTPATIENT)
Dept: PULMONOLOGY | Age: 83
End: 2018-01-05

## 2018-01-05 VITALS
OXYGEN SATURATION: 93 % | HEART RATE: 88 BPM | RESPIRATION RATE: 20 BRPM | HEIGHT: 63 IN | SYSTOLIC BLOOD PRESSURE: 130 MMHG | DIASTOLIC BLOOD PRESSURE: 74 MMHG | WEIGHT: 154 LBS | BODY MASS INDEX: 27.29 KG/M2 | TEMPERATURE: 97.8 F

## 2018-01-05 DIAGNOSIS — J43.0 SWYER-JAMES SYNDROME (HCC): Primary | ICD-10-CM

## 2018-01-05 DIAGNOSIS — J84.10 PULMONARY FIBROSIS (HCC): ICD-10-CM

## 2018-01-05 DIAGNOSIS — I65.01 STENOSIS OF RIGHT VERTEBRAL ARTERY: ICD-10-CM

## 2018-01-05 PROCEDURE — 1123F ACP DISCUSS/DSCN MKR DOCD: CPT | Performed by: INTERNAL MEDICINE

## 2018-01-05 PROCEDURE — G8598 ASA/ANTIPLAT THER USED: HCPCS | Performed by: INTERNAL MEDICINE

## 2018-01-05 PROCEDURE — 1036F TOBACCO NON-USER: CPT | Performed by: INTERNAL MEDICINE

## 2018-01-05 PROCEDURE — 4040F PNEUMOC VAC/ADMIN/RCVD: CPT | Performed by: INTERNAL MEDICINE

## 2018-01-05 PROCEDURE — G8427 DOCREV CUR MEDS BY ELIG CLIN: HCPCS | Performed by: INTERNAL MEDICINE

## 2018-01-05 PROCEDURE — G8484 FLU IMMUNIZE NO ADMIN: HCPCS | Performed by: INTERNAL MEDICINE

## 2018-01-05 PROCEDURE — 99215 OFFICE O/P EST HI 40 MIN: CPT | Performed by: INTERNAL MEDICINE

## 2018-01-05 PROCEDURE — 3023F SPIROM DOC REV: CPT | Performed by: INTERNAL MEDICINE

## 2018-01-05 PROCEDURE — G8417 CALC BMI ABV UP PARAM F/U: HCPCS | Performed by: INTERNAL MEDICINE

## 2018-01-05 PROCEDURE — G8926 SPIRO NO PERF OR DOC: HCPCS | Performed by: INTERNAL MEDICINE

## 2018-01-05 PROCEDURE — 1090F PRES/ABSN URINE INCON ASSESS: CPT | Performed by: INTERNAL MEDICINE

## 2018-01-05 RX ORDER — FLUTICASONE FUROATE AND VILANTEROL 200; 25 UG/1; UG/1
200 POWDER RESPIRATORY (INHALATION) DAILY
Qty: 1 EACH | Refills: 0 | COMMUNITY
Start: 2018-01-05 | End: 2018-03-02 | Stop reason: ALTCHOICE

## 2018-01-05 NOTE — PROGRESS NOTES
distress. Eyes: PERRL. No sclera icterus. No conjunctival injection. ENT: No discharge. Pharynx clear. External appearance of ears and nose normal.  Neck: Trachea midline. No obvious mass. Resp: No accessory muscle use. ++ crackles @ bases. No wheezes. No rhonchi. CV: Regular rate. Regular rhythm. No murmur or rub. No edema. Skin: Warm, dry, normal texture and turgor. No nodule on exposed extremities. Lymph: No cervical LAD. No supraclavicular LAD. M/S: No cyanosis. No clubbing. No joint deformity. Neuro: Moves all four extremities. Psych: Oriented x 3. No anxiety. Awake. Alert. Intact judgement and insight. Current Outpatient Prescriptions   Medication Sig Dispense Refill    levothyroxine (SYNTHROID) 100 MCG tablet TAKE 1 TABLET DAILY 90 tablet 3    amLODIPine (NORVASC) 5 MG tablet TAKE 1 TABLET DAILY 90 tablet 3    hydrochlorothiazide (HYDRODIURIL) 25 MG tablet TAKE 1 TABLET DAILY 90 tablet 3    simvastatin (ZOCOR) 20 MG tablet TAKE 1 TABLET EVERY EVENING 90 tablet 3    aspirin 81 MG tablet Take 81 mg by mouth daily      Ascorbic Acid (VITAMIN C) 500 MG tablet Take 500 mg by mouth daily      Multiple Minerals-Vitamins (CALCIUM & VIT D3 BONE HEALTH PO) Take by mouth daily      Multiple Vitamins-Minerals (MULTIVITAL PO) Take  by mouth. No current facility-administered medications for this visit. Data Reviewed:   CBC and Renal reviewed  Last CBC  No results found for: WBC, RBC, HGB, MCV, PLT  Last Renal  Lab Results   Component Value Date     12/01/2017    K 3.6 12/01/2017     12/01/2017    CO2 27 12/01/2017    CO2 26 07/24/2017    CO2 27 01/11/2017    BUN 11 12/01/2017    CREATININE 0.5 12/01/2017    GLUCOSE 93 12/01/2017    CALCIUM 9.9 12/01/2017       Last ABG  POC Blood Gas: No results found for: POCPH, POCPCO2, POCPO2, POCHCO3, NBEA, [id number]  No results for input(s): PH, PCO2, PO2, HCO3, BE, O2SAT in the last 72 hours.     Chest imaging reports were reviewed and imaging was reviewed by me and showed   Radiology Review:  Pertinent images / reports were reviewed as a part of this visit. CT Chest w/ contrast: No results found for this or any previous visit. CT Chest w/o contrast:   Results for orders placed during the hospital encounter of 11/17/17   CT Chest WO Contrast    Narrative EXAMINATION:  CT OF THE CHEST WITHOUT CONTRAST 11/17/2017 7:24 am    TECHNIQUE:  CT of the chest was performed without the administration of intravenous  contrast. Multiplanar reformatted images are provided for review. Dose  modulation, iterative reconstruction, and/or weight based adjustment of the  mA/kV was utilized to reduce the radiation dose to as low as reasonably  achievable. COMPARISON:  None    HISTORY:  ORDERING PHYSICIAN PROVIDED HISTORY: SOB (shortness of breath)  TECHNOLOGIST PROVIDED HISTORY:  Technologist Provided Reason for Exam: pt has increased sob  Acuity: Acute  Type of Encounter: Subsequent/Follow-up  Relevant Medical/Surgical History: hx of left sided breast CA with  mastectomy, COPD, HTN    FINDINGS:  Mediastinum: There is no pathologic lymphadenopathy. There is  atherosclerotic disease of the thoracic aorta, without evidence of aneurysm. There is enlargement of the main and central pulmonary arteries, suggestive  of chronic pulmonary arterial hypertension. There is coronary  atherosclerosis. No pericardial abnormality is identified. Lungs/pleura: The central airways are patent. There is a background of  moderate emphysema. There is mild chronic biapical pleural and parenchymal  scarring. There are scattered reticular and ground-glass opacities within  the bilateral lungs, with a primarily subpleural distribution, most likely  representing interstitial pulmonary fibrosis and parenchymal scarring. An  acute superimposed interstitial process cannot be excluded.   There are  multiple scattered nonspecific noncalcified nodules throughout both

## 2018-01-09 ENCOUNTER — TELEPHONE (OUTPATIENT)
Dept: PULMONOLOGY | Age: 83
End: 2018-01-09

## 2018-01-16 ENCOUNTER — HOSPITAL ENCOUNTER (OUTPATIENT)
Dept: CARDIAC REHAB | Age: 83
Discharge: HOME | End: 2018-01-16
Attending: INTERNAL MEDICINE | Admitting: INTERNAL MEDICINE

## 2018-01-16 DIAGNOSIS — Z85.3 PERSONAL HISTORY OF MALIGNANT NEOPLASM OF BREAST: ICD-10-CM

## 2018-01-16 DIAGNOSIS — J43.0 SWYER-JAMES SYNDROME (HCC): ICD-10-CM

## 2018-01-18 ENCOUNTER — HOSPITAL ENCOUNTER (OUTPATIENT)
Dept: OTHER | Age: 83
Discharge: HOME | End: 2018-01-31
Attending: INTERNAL MEDICINE | Admitting: INTERNAL MEDICINE

## 2018-01-18 ENCOUNTER — TELEPHONE (OUTPATIENT)
Dept: PULMONOLOGY | Age: 83
End: 2018-01-18

## 2018-01-18 VITALS — WEIGHT: 154.7 LBS | BODY MASS INDEX: 27.4 KG/M2

## 2018-01-18 PROBLEM — J43.0 SWYER-JAMES SYNDROME (HCC): Status: ACTIVE | Noted: 2018-01-18

## 2018-01-18 PROCEDURE — 94618 PULMONARY STRESS TESTING: CPT | Performed by: INTERNAL MEDICINE

## 2018-01-18 NOTE — PROGRESS NOTES
[facility] Facility-Based Therapy for COPD  INDIVIDUAL TREATMENT PLAN (ITP)     NAME: Jone Gomez. O.B: 3/12/1928  Account Number: [de-identified]  AGE: 80 y.o. Diagnosis: Swyer-Wilver Syndrome/Pulmonary Fibrosis  PFT: 12/11/17  FEV1/FVC actual: 67%, FEV1: 103% FVC: 112%   Notes: Medicare requirements for Pulmonary Rehabilitation include a PFT within the last 12 months revealing: FEV1/FVC <70, and FEV1 <80%, and documentation from the referring physician stating that the patient has quit smoking or will participate in smoking cessation activities prior to, or during the Pulmonary Rehab program.  A 6 Minute Walk Test (6 MWT) at the beginning and end of the program is also indicated.   GLOSSARY: PF= Physical Fitness  TM=Treadmill  AD= Schwinn AirDyne Bike  UBE=Upperbody Ergometry LBE=Lowerbody Ergometer  NS=NuStep SF= SciFit  ST=Step Training  RT=Resistance Training   PLB=Pursed Lip Breathing   DY= Dynabands  HW= Handweights   Cybex RT= Cybex Mult istation weight machine   RB=Recumbent    REFERRING PHYSICIAN: Dr. Maria G Landers History:     Past Medical History:   Diagnosis Date    Abnormal EKG     Atrial arrhythmia     Echo 09 LVEF 65-70%    Breast cancer (HCC)     s/p mastectomy    COPD (chronic obstructive pulmonary disease) (HCC)     Hyperlipidemia     managed by PCP    Hypothyroidism     managed by PCP    Preop cardiovascular exam     prior to having cataract surgery       Surgical History:     Past Surgical History:   Procedure Laterality Date    MASTECTOMY      OVARY REMOVAL      TONSILLECTOMY AND ADENOIDECTOMY         Major Symptoms:     Cough/sputum  No      Wheezing  No     Chest Discomfort  No     Orthopnea  No     Sleep Disturbances No     Edema   No     Dyspnea  Yes/ Exertional    Oxygen Therapy:      Home O2    lpm  []  Prn  [] continuous  []  HS     [] CPAP []  BiPAP     Company: Comments:    Occupational/ Recreational Activities:    Employment Status:  []  FT  []  PT  [] Disabled [x] Retired    Comments:       Occupation: Clerical          Hobbies/Leisure activities: Crosswords Puzzles, Reading    Social/Spiritual:        Social History     Social History    Marital status:      Spouse name: N/A    Number of children: N/A    Years of education: N/A     Social History Main Topics    Smoking status: Former Smoker     Packs/day: 1.00     Years: 20.00     Quit date: 12/1/1992    Smokeless tobacco: Never Used    Alcohol use Yes      Comment: occ    Drug use: No    Sexual activity: Not on file     Other Topics Concern    Not on file     Social History Narrative    No narrative on file          Do you live alone: []  Alone [x]  with spouse/family       Do you have stairs in your home  []  no [x]  yes        Comment: 13 stairs to basement laundry       Transportation  [x]  drive self []  family/friend     Comment:       Cultural/Spiritual Influences: (Amish groups, etc)       Any Cultural or Mandaen practices we should be aware of?  No                    Fall Risk Assessment:      []  Cognitive Impairment []  Balance/Gait Disturbance   []  Fall History   []  Visual Difficulty   []  Dizziness   []  Other Comments:     Physical Assessment:    Color: [x]  natural []  pale [] cyanotic []  flushed []  jaundice    Skin Integrity [x]  intact [] other:    Heart Rate 91                       Resp Rate 18      Breath Sounds: Faint bibasilar crackles    Blood Pressures Sitting: Rt arm 138/76       Resting SpO2: 93%  Resp effort/pattern: Easy/Regular      Peripheral pulses: Yes    Edema:  No    Numbness/tingling:  No    Orthopedic/exercise limitations:  No      Psychosocial assessment:    Support System:Spouse & daughter    Physical/Behavioral signs of abuse/neglect:No    Advance Directives:  Yes  [] info given    Learning Preferences: Primary Language:English        Preferred method of learning []  video []  handouts        [] listening/lecture   [x]  all    Memory impairment? No     EXERCISE  Initial Assessment  Day 1 EXERCISE  Intervention  Day 2-30 EXERCISE  Re-Assessment  Day 31-60 EXERCISE  Re-Assessment  Day 61-90+ EXERCISE  Last Day   Date:   1/18/17 Date:  Date:  Date:  Date:           Pre Rehab  6 MWT  899 ft = 86% pred (normal)  2.3 METs  SpO2: 85%    1.7  MPH   HR: 129bpm      RPD: 3, RPE: 0                                           Post Rehab   6 MWT  ft = % pred   METs  SpO2: %  MPH  HR:  bpm      RPD:  , RPE:                                        GOALS  List at least 2 patient specific goals    [x]Improve activity for tolerance for routine tasks and walking    [x]Learn proper breathing techniques including PLB    [x]Learn proper pacing with activities    []Learn proper use of oxygen, systems and safety    [x]Learn proper exercise guidelines    []Learn proper nutrition for my diagnosis                              Learning Barrier(s)  [] Speech           [] Literacy              [] Hearing          [] Cognitive            [] Vision             [x]  Ready to Learn          Balance Issues  [] Y  [x] N            Orthopedic Issues  []  Y[x]  N        Rate your Physical   Fitness (PF)?    6/10    Handgrip:  Right:10  Left:7    EDUCATION  [x]  Staff Introduction  [x]  Proper setup/O2 use  [x]  Equipment [place]  [x]  RPD/RPE Explain  [x]  SpO2/HR Explain  [x]  Breathing Retraining  [x]  Explain Intensity  [x] Initial Levels set GOALS                                        If Patient has a Learning Barrier, action:                   If pt has balance or orthopedic issues:  Interventions:                    Rate your physical   fitness (PF)?:   /10        -30 day PF goal:  /10    EDUCATION  [] Understands proper set/up O2 use  []  Understands SpO2 and RPD? [] Aerobic progression explained? []  Intensity progression explained?           GOALS Min              Did pt. progress in rehab?:     30 DAY TARGET GOAL  [x] Start slowly but progress each week  [x] Increase duration on the equipment  [x] Start resistance training    -30 day PF goal: 7/10                 Current Home Exercise : None    Frequency:      Type:                     Health Knowledge   Test Score: N/A       Current Home Exercise:   Frequency:      Type:         Time:  min                                  Current Home Exercise:   Frequency:       Type:         Time:  min                                Current Home Exercise:   Frequency:       Type:         Time:  min                                                      Discharge exercise plan                                  Repeat Health Knowledge Test Score :    /25     [facility] INDIVIDUAL TREATMENT PLAN  SMOKING AND   OTHER COMPONENTS    Smoking/Other  Components   Initial Assessment  Day 1 Smoking/Other  Components  Intervention  Day 2-30 Smoking/Other  Components  Re-Assessment  Day 31-60 Smoking/Other  Components  Re-Assessment Day 61-90+ Smoking/Other  Components  Discharge  Final Day   Tobacco Use Hx  [x] Y  [] N?:   Quit Date: 1992  Cig/Day: 20  Years: 20  Tobacco Use  Any change in Smoking Status?:  No  [x]  not smoking  Quit Date:   (if applicable)  Intervention  []  Information/ed material given on cessation techniques  []  smoking cessation class schedule given Tobacco Use  Any change in Smoking Status?:      Quit Date:   (if applicable)          Intervention  [] Referral to Tobacco Cessation Specialist (TCS) Tobacco Use  Any change in Smoking Status?:     Quit Date:   (if applicable)        Intervention Tobacco Use  Any change in Smoking Status?:     Quit Date:   (if applicable)          Intervention Tobacco Use  Any change in Smoking Status?:   Quit Date:   (if applicable)            Intervention  [] Pt used TCS counseling           Other  Components:    [x]  Environmental Factors    [x]  Prevention Management of plan:    Goals:   []  Manage Hypoxemia  []  receive adequate portable system  []  Compliant with use as prescribed  [] Learn O2 safety guidelines           Medications  []  Uses as prescribed  []  Non- compliant with prescribed meds    Respiratory Medications    []  Proper use and technique of MDI, DPI and/or nebs  []  Incorrect use and technique of MDI, DPI and /or nebs    Hypoxemia  Problem:      Current Oxygen Prescription:    l/min rest   l/min exercise   titration plan/weaning plan:    Goals:   []  Manage Hypoxemia  []  receive adequate portable system  [] Compliant with use as prescribed  []  Learn O2 safety guidelines     Medications    [] Compliant  []  Noncompliant       Respiratory Medications    []  Compliant  [] Noncompliant              Hypoxemia  Problem:    []  Compliant  []  Noncompliant    Final Oxygen Prescription:    l/min rest   l/min exercise                                                           [facility] INDIVIDUAL TREATMENT PLAN  NUTRITION DOMAIN:        NUTRITION   Initial Assessment  Day 1 NUTRITION   Intervention  Day 2-30 NUTRITION   Re-Assessment  Day 31-60 NUTRITION   Re-Assessment Day 61-90+ NUTRITION Discharge  Final Day   Weight Management:  154.7# lbs  Current BMI: 29.2 Weight Management:   lbs  Current BMI Weight Management:    lbs  Current BMI Weight Management:    lbs  Current BMI Weight Management:    lbs  Current BMI   Diabetes?: No  A1C   Fasting BS:   Insulin?:    Oral agent? Diabetes:  If y, has patient seen a positive trend in FBS?:      Intervention    [x] Basic nutrition education   [] Referral to Diabetes Education? [] Referral to weightloss/nutrition education     Intervention    [] Pt has had Nutrition class? [] Informal questions asked regarding nutrition/weight control Intervention    []  Pt has had Nutrition class? [] Questions addressed Intervention    []  Pt has had Nutrition class?    Intervention    Pt aware of:     [] Pulmonary eating techniques   [] depression present, action taken:     Is the patient receiving support for the IN program at home?:  []  Y    [] N    Is the patient tolerating the intensity and duration of the IN program?:     [] Y   [] N Re-Assessment  [] S/S of depression present? If [x] , action:         Med Changes?:   [] Y   [] N        Is the patient tolerating the intensity and duration of the IN program?:    []  Y    [] N  Re-Assessment  Psych Issue notes:        Is the patient receiving support for the IN program at home?:  []  Y  [] N    Is the patient tolerating the intensity and duration of the IN program?:  []  Y   []  N     Final Assessment                       Results of any Physician/ Counselor Intervention?:     [] Y   [] N         GOALS        30 DAY TARGET GOAL    [x]  Assess Mental Score using   SF-36 and PHQ-9   [x]  Teach PLB  [x]  Reassure pt that (s)he can stop and rest, adjust the speeds, and/or switch modalities from our suggestions  -    (0 being 'None', 10 being 'Very'),  -How would you rate your Anxiety Level: 1      -How would you rate your level of  depression: 0    -How would you rate your mood: (0 is negative all the time, 10 is positive all the time):  10          ADL's  Assess PF score on SF-36    Score= 30    From [facility], ADL pt struggles with most: Walking up stairs/hill    Out of 10, rate your ability to do that ADL now.   4     GOALS        Did pt meet Initial 30 day   Target Goal(s)?:       30 DAY NEW TARGET GOAL    [] Decrease/Maintain anxiety and depression level  [] Increase ability to complete ADL  [] Encourage pt to rate exercises truthfully to encourage correct intensity / duration prescription  -  (0 being 'None', 10 being 'Very'),  -How would you rate your Anxiety Level:      -How would you rate your level of depression:       -How would you rate your mood: GOALS        Did pt meet previous 30 day Target Goal(s)?:      RE- ASSESSMENT GOAL    [] Decrease/  Maintain anxiety and depression level  [] Depression/Anxiety and Social Functioning: Geovanny Beckwith lives at home with . She is able to perform light household duties but does have a cleaning service bi-weekly. Her  does the grocery shopping. She states she is unable to walk for long periods of time due to her shortness of breath. She must stop frequently and rest. She denies problems with anxiety, rating her level a 1/10 and states she has no depression. Other:                   Referring Physician: Rin Carrion                           Fax #: [phone]    Reviewed and electronically signed by Dr Doni Mckeon.  Nancy Reyez, Medical Director

## 2018-01-18 NOTE — PROGRESS NOTES
[facility] Facility-Based Therapy for COPD  INDIVIDUAL TREATMENT PLAN (ITP)     NAME: Pamela Rick. O.B: 3/12/1928  Account Number: [de-identified]  AGE: 80 y.o. Diagnosis: Swyer-Wilver Syndrome/Pulmonary Fibrosis  PFT: 12/11/17  FEV1/FVC actual: 67%, FEV1: 103% FVC: 112%   Notes: Medicare requirements for Pulmonary Rehabilitation include a PFT within the last 12 months revealing: FEV1/FVC <70, and FEV1 <80%, and documentation from the referring physician stating that the patient has quit smoking or will participate in smoking cessation activities prior to, or during the Pulmonary Rehab program.  A 6 Minute Walk Test (6 MWT) at the beginning and end of the program is also indicated.   GLOSSARY: PF= Physical Fitness  TM=Treadmill  AD= [employer] AirDyne Bike  UBE=Upperbody Ergometry LBE=Lowerbody Ergometer  NS=NuStep SF= SciFit  ST=Step Training  RT=Resistance Training   PLB=Pursed Lip Breathing   DY= Dynabands  HW= Handweights   Cybex RT= Cybex Mult istation weight machine   RB=Recumbent    REFERRING PHYSICIAN: Dr. Deborah Woodson History:     Past Medical History:   Diagnosis Date    Abnormal EKG     Atrial arrhythmia     Echo 09 LVEF 65-70%    Breast cancer (HCC)     s/p mastectomy    COPD (chronic obstructive pulmonary disease) (HCC)     Hyperlipidemia     managed by PCP    Hypothyroidism     managed by PCP    Preop cardiovascular exam     prior to having cataract surgery       Surgical History:     Past Surgical History:   Procedure Laterality Date    MASTECTOMY      OVARY REMOVAL      TONSILLECTOMY AND ADENOIDECTOMY         Major Symptoms:     Cough/sputum  No      Wheezing  No     Chest Discomfort  No     Orthopnea  No     Sleep Disturbances No     Edema   No     Dyspnea  Yes/ Exertional    Oxygen Therapy:      Home O2    lpm  []  Prn  [] continuous  []  HS     [] CPAP []  BiPAP     Company: [employer]), RT   8-16 reps    CAN USE ALL EQUIPMENT EXCEPT        Time (Ti): Aerobic:   30-40 min        Progression:   1-2 min total time for TM, AB, NS, SF or Arm ergometer per session or when a steady state has occurred in RPE if  SpO2 and HR levels are acceptable        Is pt. progressing in rehab?:               PHYSICIAN DIRECTED   EXERCISE RX       Titrate O2 to keep SpO2 >89%    Frequency (F): 2-3x/wk in Rehab, 1-3x/wk home walking       Intensity (I):  Initial + 5-10% increase each week when a steady state has occurred in RPE if  SpO2 and HR levels are acceptable  Type (T)  Aerobic (TM,NS, SFR,SFS, AE, AB, RB), RT   8-16 reps    CAN USE ALL EQUIPMENT EXCEPT        Time (Ti): Aerobic:   30-50 min     Progression:   1-2 min total time for TM, AB, NS, SF or Arm ergometer per session or when a steady state has occurred in RPE if  SpO2 and HR levels are acceptable              Is pt. progressing in rehab?:                 PHYSICIAN DIRECTED   EXERCISE RX       Titrate O2 to keep SpO2 >89%    Frequency (F): 2-3x/wk in Rehab, 1-3x/wk home walking       Intensity (I):  Initial + 5-10% increase each week when a steady state has occurred in RPE if  SpO2 and HR levels are acceptable  Type (T):   Aerobic (TM,NS, SFR,SFS, AE, AB, RB), RT   8-16 reps    CAN USE ALL EQUIPMENT EXCEPT          Time (Ti): Aerobic:   30-58 min         Progression:   1-2 min total time for TM, AB, NS, SF or Arm ergometer per session or when a steady state has occurred in RPE if  SpO2 and HR levels are acceptable            Is pt. progressing in rehab?:               Final Intensity (I): See below and final 6MWT above            ACHIEVED TOTAL AEROBIC EXERCISE TIME:  min         FINAL LEVELS:  [] TM: min  [] Nustep: level  min  [] Arm ergometer: barajas min  [] Scifit: level min  [] HW :  # x  reps  [] Dy:    X   reps  [] Cybex RT:    # x   Reps  [] Eliptical:level  X  Min  [] Arc: level   X    mins  [] RB:  Level  X   mins  [] AB: level   X Bronchial Hygiene / Preventing Infection  []  Resistance Training  [] Benefits of Exercise  [] Energy Cons    Education  Individual instruction  [] PLB  [] RPD/RPE   []  O2 use  []  review PFT  [] Inhalers   [] Home Activity/Warm up/ stretches  Attend Classes:  [] Nutrition  []  Panic conntrol, relaxation, managing depression  [] A&P of the Respiratory System   [] Environ. Issues+ Travel   [] Bronchial Hygiene / Preventing Infection  []  Resistance Training  [] Benefits of Exercise  [] Energy Cons   Education  []  Addressed pt questions on Education Topics                                                         Physician Interaction / Response:  (If none, continue on present care plan)     Physician Interaction / Response:   (If none, continue on present care plan)   Physician Interaction / Response:   (If none, continue on present care plan)   Physician Interaction / Response:   (If none, continue on present care plan)   Physician Interaction / Response:       Discharge the patient. Rehab Potential:  []  Good [] Fair [] Poor    Summary: Initial IA Interview 1/18/18. Maria Lebron is an 79 yo female with a history of COPD, Pulmonary Fibrosis, Hypothyroidism, Hyperlipidemia, HTN, Atrial Arrhythmia, Breast CA. Ruben Alisha She was recently diagnosed with Swyer-Wilver syndrome and increased exertional shortness of breath and was referred to Pulmonary Rehab by her pulmonologist. She currently does not wear oxygen at home but was noted to be hypoxic during her 6mw, requiring 4lpm with exertion. She will attend IA twice weekly. Cecilia Dent will attend 28 sessions of IA Phase 2     Exercise: Cecilia Dent will participate in 15-45 min of aerobic exercise, time and intensity to be determined base on her RPE. Oxygen: 4lpm during exercise,  titrate to maintain Spo2 >89%    Medications: N/A    Nutrition: Wt. 154.7, BMI 29.2. She will attend General Nutrition class during IA.     Psychosocial including Dyspnea with ADL's,

## 2018-01-19 ENCOUNTER — INITIAL CONSULT (OUTPATIENT)
Dept: SURGERY | Age: 83
End: 2018-01-19

## 2018-01-19 VITALS
BODY MASS INDEX: 27.29 KG/M2 | DIASTOLIC BLOOD PRESSURE: 80 MMHG | SYSTOLIC BLOOD PRESSURE: 138 MMHG | WEIGHT: 154 LBS | HEIGHT: 63 IN

## 2018-01-19 DIAGNOSIS — J43.0 SWYER-JAMES SYNDROME (HCC): ICD-10-CM

## 2018-01-19 DIAGNOSIS — I10 ESSENTIAL HYPERTENSION: ICD-10-CM

## 2018-01-19 DIAGNOSIS — E03.9 ACQUIRED HYPOTHYROIDISM: ICD-10-CM

## 2018-01-19 DIAGNOSIS — E78.2 MIXED HYPERLIPIDEMIA: ICD-10-CM

## 2018-01-19 DIAGNOSIS — Z85.3 PERSONAL HISTORY OF MALIGNANT NEOPLASM OF BREAST: ICD-10-CM

## 2018-01-19 DIAGNOSIS — I65.01 STENOSIS OF RIGHT VERTEBRAL ARTERY: Primary | ICD-10-CM

## 2018-01-19 PROCEDURE — 1036F TOBACCO NON-USER: CPT | Performed by: SURGERY

## 2018-01-19 PROCEDURE — G8484 FLU IMMUNIZE NO ADMIN: HCPCS | Performed by: SURGERY

## 2018-01-19 PROCEDURE — 1090F PRES/ABSN URINE INCON ASSESS: CPT | Performed by: SURGERY

## 2018-01-19 PROCEDURE — 3023F SPIROM DOC REV: CPT | Performed by: SURGERY

## 2018-01-19 PROCEDURE — G8417 CALC BMI ABV UP PARAM F/U: HCPCS | Performed by: SURGERY

## 2018-01-19 PROCEDURE — 99203 OFFICE O/P NEW LOW 30 MIN: CPT | Performed by: SURGERY

## 2018-01-19 PROCEDURE — 1123F ACP DISCUSS/DSCN MKR DOCD: CPT | Performed by: SURGERY

## 2018-01-19 PROCEDURE — G8427 DOCREV CUR MEDS BY ELIG CLIN: HCPCS | Performed by: SURGERY

## 2018-01-19 PROCEDURE — G8598 ASA/ANTIPLAT THER USED: HCPCS | Performed by: SURGERY

## 2018-01-19 PROCEDURE — 4040F PNEUMOC VAC/ADMIN/RCVD: CPT | Performed by: SURGERY

## 2018-01-19 PROCEDURE — G8926 SPIRO NO PERF OR DOC: HCPCS | Performed by: SURGERY

## 2018-01-19 ASSESSMENT — ENCOUNTER SYMPTOMS
ALLERGIC/IMMUNOLOGIC NEGATIVE: 1
GASTROINTESTINAL NEGATIVE: 1
BACK PAIN: 0
EYE ITCHING: 0
RESPIRATORY NEGATIVE: 1
PHOTOPHOBIA: 0
EYE DISCHARGE: 0
EYE REDNESS: 0
EYE PAIN: 0

## 2018-01-22 ENCOUNTER — TELEPHONE (OUTPATIENT)
Dept: PULMONOLOGY | Age: 83
End: 2018-01-22

## 2018-01-23 ENCOUNTER — HOSPITAL ENCOUNTER (OUTPATIENT)
Dept: CARDIAC REHAB | Age: 83
Discharge: HOME | End: 2018-01-24
Admitting: INTERNAL MEDICINE

## 2018-01-23 VITALS — WEIGHT: 153 LBS | BODY MASS INDEX: 27.1 KG/M2

## 2018-01-25 ENCOUNTER — HOSPITAL ENCOUNTER (OUTPATIENT)
Dept: CARDIAC REHAB | Age: 83
Discharge: HOME | End: 2018-01-26
Admitting: INTERNAL MEDICINE

## 2018-01-25 VITALS — WEIGHT: 154.4 LBS | BODY MASS INDEX: 27.35 KG/M2

## 2018-01-30 ENCOUNTER — HOSPITAL ENCOUNTER (OUTPATIENT)
Dept: CARDIAC REHAB | Age: 83
Discharge: HOME | End: 2018-01-31
Admitting: INTERNAL MEDICINE

## 2018-01-30 VITALS — WEIGHT: 153.8 LBS | BODY MASS INDEX: 27.24 KG/M2

## 2018-02-01 ENCOUNTER — HOSPITAL ENCOUNTER (OUTPATIENT)
Dept: CARDIAC REHAB | Age: 83
Discharge: HOME | End: 2018-02-02
Admitting: INTERNAL MEDICINE

## 2018-02-01 ENCOUNTER — HOSPITAL ENCOUNTER (OUTPATIENT)
Dept: OTHER | Age: 83
Discharge: HOME | End: 2018-02-28
Attending: INTERNAL MEDICINE | Admitting: INTERNAL MEDICINE

## 2018-02-01 VITALS — BODY MASS INDEX: 27.4 KG/M2 | WEIGHT: 154.7 LBS

## 2018-02-06 ENCOUNTER — HOSPITAL ENCOUNTER (OUTPATIENT)
Dept: CARDIAC REHAB | Age: 83
Discharge: HOME | End: 2018-02-07
Admitting: INTERNAL MEDICINE

## 2018-02-06 VITALS — WEIGHT: 154 LBS | BODY MASS INDEX: 27.28 KG/M2

## 2018-02-08 ENCOUNTER — HOSPITAL ENCOUNTER (OUTPATIENT)
Dept: CARDIAC REHAB | Age: 83
Discharge: HOME | End: 2018-02-09
Admitting: INTERNAL MEDICINE

## 2018-02-08 VITALS — WEIGHT: 154 LBS | BODY MASS INDEX: 27.28 KG/M2

## 2018-02-09 RX ORDER — SIMVASTATIN 20 MG
TABLET ORAL
Qty: 90 TABLET | Refills: 3 | Status: SHIPPED | OUTPATIENT
Start: 2018-02-09 | End: 2019-01-24 | Stop reason: SDUPTHER

## 2018-02-13 ENCOUNTER — HOSPITAL ENCOUNTER (OUTPATIENT)
Dept: CARDIAC REHAB | Age: 83
Discharge: HOME | End: 2018-02-14
Admitting: INTERNAL MEDICINE

## 2018-02-13 VITALS — BODY MASS INDEX: 27 KG/M2 | WEIGHT: 152.4 LBS

## 2018-02-15 ENCOUNTER — HOSPITAL ENCOUNTER (OUTPATIENT)
Dept: CARDIAC REHAB | Age: 83
Discharge: HOME | End: 2018-02-16
Admitting: INTERNAL MEDICINE

## 2018-02-15 VITALS — WEIGHT: 154.2 LBS | BODY MASS INDEX: 27.32 KG/M2

## 2018-02-16 ENCOUNTER — TELEPHONE (OUTPATIENT)
Dept: PULMONOLOGY | Age: 83
End: 2018-02-16

## 2018-02-16 NOTE — PROGRESS NOTES
up/ stretches  Attend Classes:  [] Nutrition  []  Panic conntrol, relaxation, managing depression  []  A&P of the Respiratory System   [] Environ. Issues+ Travel   [] Bronchial Hygiene / Preventing Infection  []  Resistance Training  [] Benefits of Exercise  [] Energy Cons    Education  Individual instruction  [] PLB  [] RPD/RPE   []  O2 use  []  review PFT  [] Inhalers   [] Home Activity/Warm up/ stretches  Attend Classes:  [] Nutrition  []  Panic conntrol, relaxation, managing depression  [] A&P of the Respiratory System   [] Environ. Issues+ Travel   [] Bronchial Hygiene / Preventing Infection  []  Resistance Training  [] Benefits of Exercise  [] Energy Cons   Education  []  Addressed pt questions on Education Topics                                                         Physician Interaction / Response:  (If none, continue on present care plan)     Physician Interaction / Response:   (If none, continue on present care plan)   Physician Interaction / Response:   (If none, continue on present care plan)   Physician Interaction / Response:   (If none, continue on present care plan)   Physician Interaction / Response:       Discharge the patient. Rehab Potential:  []  Good [] Fair [] Poor    Summary: Initial DC Interview 1/18/18. Clarissa Braga is an 79 yo female with a history of COPD, Pulmonary Fibrosis, Hypothyroidism, Hyperlipidemia, HTN, Atrial Arrhythmia, Breast CA. Cesar Gudino She was recently diagnosed with Swyer-Wilver syndrome and increased exertional shortness of breath and was referred to Pulmonary Rehab by her pulmonologist. She currently does not wear oxygen at home but was noted to be hypoxic during her 6mw, requiring 4lpm with exertion. She will attend DC twice weekly. Jodi Hinojosa will attend 28 sessions of DC Phase 2     Exercise: Jodi Hinojosa will participate in 15-45 min of aerobic exercise, time and intensity to be determined base on her RPE.     Oxygen: 4lpm during exercise,  titrate to maintain Spo2

## 2018-02-16 NOTE — PROGRESS NOTES
[facility] [facility] Facility-Based Therapy for COPD  INDIVIDUAL TREATMENT PLAN (ITP)     NAME: Chapis Sánchez. O.B: 3/12/1928  Account Number: [de-identified]  AGE: 80 y.o. Diagnosis: Swyer-Wilver Syndrome/Pulmonary Fibrosis  PFT: 12/11/17  FEV1/FVC actual: 67%, FEV1: 103% FVC: 112%   Notes: Medicare requirements for Pulmonary Rehabilitation include a PFT within the last 12 months revealing: FEV1/FVC <70, and FEV1 <80%, and documentation from the referring physician stating that the patient has quit smoking or will participate in smoking cessation activities prior to, or during the Pulmonary Rehab program.  A 6 Minute Walk Test (6 MWT) at the beginning and end of the program is also indicated.   GLOSSARY: PF= Physical Fitness  TM=Treadmill  AD= [employer] Air[employer] Bike  UBE=Upperbody Ergometry LBE=Lowerbody Ergometer  NS=NuStep SF= SciFit  ST=Step Training  RT=Resistance Training   PLB=Pursed Lip Breathing   DY= Dynabands  HW= Handweights   Cybex RT= Cybex Mult istation weight machine   RB=Recumbent    REFERRING PHYSICIAN: Dr. Andrea Dent History:     Past Medical History:   Diagnosis Date    Abnormal EKG     Atrial arrhythmia     Echo 09 LVEF 65-70%    Breast cancer (HCC)     s/p mastectomy    COPD (chronic obstructive pulmonary disease) (HCC)     Hyperlipidemia     managed by PCP    Hypothyroidism     managed by PCP    Preop cardiovascular exam     prior to having cataract surgery       Surgical History:     Past Surgical History:   Procedure Laterality Date    MASTECTOMY      OVARY REMOVAL      TONSILLECTOMY AND ADENOIDECTOMY         Major Symptoms:     Cough/sputum  No      Wheezing  No     Chest Discomfort  No     Orthopnea  No     Sleep Disturbances No     Edema   No     Dyspnea  Yes/ Exertional    Oxygen Therapy:      Home O2    lpm  []  Prn  [] continuous  []  HS     [] CPAP []  BiPAP     Company: explained? GOALS                                                                                           Rate your physical   fitness (PF)?:   /10        -30 day PF goal:  /10    EDUCATION   []  Home Activity education offered  [] Stretching/ Flexibility education offered  [] Attended Benefits of Exercise Class  []  Attended Resistance Training Class                                                    GOALS                                                                                           Rate your physical   fitness (PF)?:   /10    -30 day PF goal:  /10    EDUCATION  [] Attended all individually relevant exercise education sessions? []  Knows current exercise goals and recmndtns?:                                                  Goals Achieved? Rate your physical fitness now?:     /10  Handgrip:  Right:  Left:    Discharge  EDUCATION  []  Attended all individually relevant exercise education sessions?    [] Knows current exercise goals and recmndtns?:                                                                                        PHYSICIAN DIRECTED   EXERCISE RX     RPE : 11 - 14  Titrate O2 to keep SpO2 >89%    Frequency (F): 2-3x/wk in Rehab,         Initial Intensity : 2.3 METs (from 6MWT)   1.3-1.8 (60-80% of walk speed for TM)    Type (T): Aerobic (TM,NS, SF, AE, AB, RB, EL, Arc), RT 1-3#, 8-16 reps    CAN USE ALL EQUIPMENT EXCEPT       Time (Ti): Aerobic:   15-40 min               Progression: 1-2 min total time for TM, AB, NS, SF or Arm ergometer per session or when a steady state has occurred in RPE if  SpO2 and HR levels are acceptable           PHYSICIAN DIRECTED   EXERCISE RX       Titrate O2 to keep SpO2 >89%    Frequency (F): 2-3x/wk in Rehab, 1-3x/wk home walking       Intensity (I):  Initial + 5-10% increase each week or when a steady state has occurred in RPE if  SpO2 and HR levels are acceptable  Type (T)  Aerobic (TM,NS, SFR,SFS, AE, AB, RB), RT   8-16 reps    CAN USE ALL EQUIPMENT EXCEPT        Time (Ti): Aerobic:   30-40 min        Progression:   1-2 min total time for TM, AB, NS, SF or Arm ergometer per session or when a steady state has occurred in RPE if  SpO2 and HR levels are acceptable        Is pt. progressing in rehab?:  yes             PHYSICIAN DIRECTED   EXERCISE RX       Titrate O2 to keep SpO2 >89%    Frequency (F): 2-3x/wk in Rehab, 1-3x/wk home walking       Intensity (I):  Initial + 5-10% increase each week when a steady state has occurred in RPE if  SpO2 and HR levels are acceptable  Type (T)  Aerobic (TM,NS, SFR,SFS, AE, AB, RB), RT   8-16 reps    CAN USE ALL EQUIPMENT EXCEPT        Time (Ti): Aerobic:   30-50 min     Progression:   1-2 min total time for TM, AB, NS, SF or Arm ergometer per session or when a steady state has occurred in RPE if  SpO2 and HR levels are acceptable              Is pt. progressing in rehab?:                 PHYSICIAN DIRECTED   EXERCISE RX       Titrate O2 to keep SpO2 >89%    Frequency (F): 2-3x/wk in Rehab, 1-3x/wk home walking       Intensity (I):  Initial + 5-10% increase each week when a steady state has occurred in RPE if  SpO2 and HR levels are acceptable  Type (T):   Aerobic (TM,NS, SFR,SFS, AE, AB, RB), RT   8-16 reps    CAN USE ALL EQUIPMENT EXCEPT          Time (Ti): Aerobic:   30-58 min         Progression:   1-2 min total time for TM, AB, NS, SF or Arm ergometer per session or when a steady state has occurred in RPE if  SpO2 and HR levels are acceptable            Is pt. progressing in rehab?:               Final Intensity (I): See below and final 6MWT above            ACHIEVED TOTAL AEROBIC EXERCISE TIME:  min         FINAL LEVELS:  [] TM: min  [] Nustep: level  min  [] Arm ergometer: barajas min  [] Scifit: level min  [] HW :  # x  reps  [] Dy:    X   reps  [] Cybex RT:    # x   Reps  [] Eliptical:level  X  Min  [] Arc: level   X mins  [] RB:  Level  X   mins  [] AB: level   X     Min              Did pt. progress in rehab?:     30 DAY TARGET GOAL  [x] Start slowly but progress each week  [x] Increase duration on the equipment  [x] Start resistance training    -30 day PF goal: 7/10                 Current Home Exercise : None    Frequency:      Type:                     Health Knowledge   Test Score: N/A       Current Home Exercise: NONE- To add stretching and chair stands  Frequency:      Type:         Time:  min                                  Current Home Exercise:   Frequency:       Type:         Time:  min                                Current Home Exercise:   Frequency:       Type:         Time:  min                                                      Discharge exercise plan                                  Repeat Health Knowledge Test Score :    /25     [facility] INDIVIDUAL TREATMENT PLAN  SMOKING AND   OTHER COMPONENTS    Smoking/Other  Components   Initial Assessment  Day 1 Smoking/Other  Components  Intervention  Day 2-30 Smoking/Other  Components  Re-Assessment  Day 31-60 Smoking/Other  Components  Re-Assessment Day 61-90+ Smoking/Other  Components  Discharge  Final Day   Tobacco Use Hx  [x] Y  [] N?:   Quit Date: 1992  Cig/Day: 20  Years: 20  Tobacco Use  Any change in Smoking Status?:  No  [x]  not smoking  Quit Date:   (if applicable)  Intervention  []  Information/ed material given on cessation techniques  []  smoking cessation class schedule given Tobacco Use  Any change in Smoking Status?:  NA    Quit Date:   (if applicable)          Intervention  [] Referral to Tobacco Cessation Specialist (TCS) Tobacco Use  Any change in Smoking Status?:     Quit Date:   (if applicable)        Intervention Tobacco Use  Any change in Smoking Status?:     Quit Date:   (if applicable)          Intervention Tobacco Use  Any change in Smoking Status?:   Quit Date:   (if applicable)            Intervention  [] Pt used TCS counseling identified? No    []  PCP notified of PHQ-9 results      []  On Anti-anxiety / anti-depression meds?: NO           Intervention  If S/S of depression present, action taken:     Is the patient receiving support for the [place] program at home?:  [x]  Y    [] N    Is the patient tolerating the intensity and duration of the [place] program?:     [x] Y   [] N Re-Assessment  [] S/S of depression present? If [x] , action:         Med Changes?:   [] Y   [] N        Is the patient tolerating the intensity and duration of the [place] program?:    []  Y    [] N  Re-Assessment  Psych Issue notes:        Is the patient receiving support for the [place] program at home?:  []  Y  [] N    Is the patient tolerating the intensity and duration of the [place] program?:  []  Y   []  N     Final Assessment                       Results of any Physician/ Counselor Intervention?:     [] Y   [] N         GOALS        30 DAY TARGET GOAL    [x]  Assess Mental Score using   SF-36 and PHQ-9   [x]  Teach PLB  [x]  Reassure pt that (s)he can stop and rest, adjust the speeds, and/or switch modalities from our suggestions  -    (0 being 'None', 10 being 'Very'),  -How would you rate your Anxiety Level: 1      -How would you rate your level of  depression: 0    -How would you rate your mood: (0 is negative all the time, 10 is positive all the time):  10          ADL's  Assess PF score on SF-36    Score= 30    From [facility], ADL pt struggles with most: Walking up stairs/hill    Out of 10, rate your ability to do that ADL now.   4     GOALS        Did pt meet Initial 30 day   Target Goal(s)?:       30 DAY NEW TARGET GOAL    [] Decrease/Maintain anxiety and depression level  [] Increase ability to complete ADL  [] Encourage pt to rate exercises truthfully to encourage correct intensity / duration prescription  -  (0 being 'None', 10 being 'Very'),  -How would you rate your Anxiety Level:1      -How would you rate your level of depression: 0      -How would you rate your up/ stretches  Attend Classes:  [] Nutrition  []  Panic conntrol, relaxation, managing depression  []  A&P of the Respiratory System   [] Environ. Issues+ Travel   [] Bronchial Hygiene / Preventing Infection  []  Resistance Training  [] Benefits of Exercise  [] Energy Cons    Education  Individual instruction  [] PLB  [] RPD/RPE   []  O2 use  []  review PFT  [] Inhalers   [] Home Activity/Warm up/ stretches  Attend Classes:  [] Nutrition  []  Panic conntrol, relaxation, managing depression  [] A&P of the Respiratory System   [] Environ. Issues+ Travel   [] Bronchial Hygiene / Preventing Infection  []  Resistance Training  [] Benefits of Exercise  [] Energy Cons   Education  []  Addressed pt questions on Education Topics                                                         Physician Interaction / Response:  (If none, continue on present care plan)     Physician Interaction / Response:   (If none, continue on present care plan)   Physician Interaction / Response:   (If none, continue on present care plan)   Physician Interaction / Response:   (If none, continue on present care plan)   Physician Interaction / Response:       Discharge the patient. Rehab Potential:  []  Good [] Fair [] Poor    Summary: Initial MD Interview 1/18/18. Micheal Akers is an 81 yo female with a history of COPD, Pulmonary Fibrosis, Hypothyroidism, Hyperlipidemia, HTN, Atrial Arrhythmia, Breast CA. [facility] She was recently diagnosed with Swyer-Wilver syndrome and increased exertional shortness of breath and was referred to Pulmonary Rehab by her pulmonologist. She currently does not wear oxygen at home but was noted to be hypoxic during her 6mw, requiring 4lpm with exertion. She will attend MD twice weekly. Shireen Martinez will attend 28 sessions of MD Phase 2     Exercise: Shireen Rosenbaumter will participate in 15-45 min of aerobic exercise, time and intensity to be determined base on her RPE.     Oxygen: 4lpm during exercise,  titrate to maintain Spo2 >89%    Medications: N/A    Nutrition: Wt. 154.7, BMI 29.2. She will attend General Nutrition class during NE. Psychosocial including Dyspnea with ADL's, Depression/Anxiety and Social Functioning: Rashad Martinez lives at home with . She is able to perform light household duties but does have a cleaning service bi-weekly. Her  does the grocery shopping. She states she is unable to walk for long periods of time due to her shortness of breath. She must stop frequently and rest. She denies problems with anxiety, rating her level a 1/10 and states she has no depression. Other:    30 Day Report: 2/16/18:    Attendance: Has completed 9 exercise sessions and 1 education class. Exercise: Exercises for 40 min at low to moderate levels. May add TM if tolerated. Nutrition: Weight= 154.4lbs- wants to maintain  Oxygen:Uses at 4 lpm for exercise. SaO2 levels above 90% during exercise. Psychosocial: Minimal anxiety, no depression, and mostly positive attitude. Medications:Takes as prescribed. Other: States she is feeling like she is improving in ability to perform ADLs.  Lara Mercado                   Referring Physician: Tayla Clemente                           Fax #: [phone]    Reviewed and electronically signed by Dr Wilfrido Altamirano

## 2018-02-16 NOTE — TELEPHONE ENCOUNTER
Patient's  wanting to know if you think she would be OK using a portable concentrator (POC) since she is on 4L and I believe only offers a pulsing option. He states she refuses to carry the E tanks when she goes out.

## 2018-02-20 ENCOUNTER — HOSPITAL ENCOUNTER (OUTPATIENT)
Dept: CARDIAC REHAB | Age: 83
Discharge: HOME | End: 2018-02-21
Admitting: INTERNAL MEDICINE

## 2018-02-20 VITALS — BODY MASS INDEX: 27.03 KG/M2 | WEIGHT: 152.6 LBS

## 2018-02-22 ENCOUNTER — HOSPITAL ENCOUNTER (OUTPATIENT)
Dept: CARDIAC REHAB | Age: 83
Discharge: HOME | End: 2018-02-23
Admitting: INTERNAL MEDICINE

## 2018-02-22 VITALS — BODY MASS INDEX: 27.14 KG/M2 | WEIGHT: 153.2 LBS

## 2018-02-23 ENCOUNTER — TELEPHONE (OUTPATIENT)
Dept: FAMILY MEDICINE CLINIC | Age: 83
End: 2018-02-23

## 2018-02-27 ENCOUNTER — HOSPITAL ENCOUNTER (OUTPATIENT)
Dept: CARDIAC REHAB | Age: 83
Discharge: HOME | End: 2018-02-28
Admitting: INTERNAL MEDICINE

## 2018-02-27 VITALS — BODY MASS INDEX: 26.47 KG/M2 | WEIGHT: 149.4 LBS

## 2018-02-28 ENCOUNTER — TELEPHONE (OUTPATIENT)
Dept: FAMILY MEDICINE CLINIC | Age: 83
End: 2018-02-28

## 2018-02-28 NOTE — TELEPHONE ENCOUNTER
Errol Camarillo from Dr Racheal Izaguirre office called, pt is seeing you on 3/2 ( former Dr Noa Orellana pt.)  Pt needs 3 teeth extracted and they need to see if they should use Local, IV sedation or General anesthesia.           Please advise, Errol Camarillo [phone]

## 2018-03-01 ENCOUNTER — HOSPITAL ENCOUNTER (OUTPATIENT)
Dept: CARDIAC REHAB | Age: 83
Discharge: HOME | End: 2018-03-02
Admitting: INTERNAL MEDICINE

## 2018-03-01 ENCOUNTER — HOSPITAL ENCOUNTER (OUTPATIENT)
Dept: OTHER | Age: 83
Discharge: HOME | End: 2018-03-31
Attending: INTERNAL MEDICINE | Admitting: INTERNAL MEDICINE

## 2018-03-01 VITALS — WEIGHT: 147 LBS | BODY MASS INDEX: 26.04 KG/M2

## 2018-03-02 ENCOUNTER — OFFICE VISIT (OUTPATIENT)
Dept: FAMILY MEDICINE CLINIC | Age: 83
End: 2018-03-02

## 2018-03-02 VITALS
TEMPERATURE: 97.5 F | BODY MASS INDEX: 26.86 KG/M2 | HEART RATE: 80 BPM | DIASTOLIC BLOOD PRESSURE: 64 MMHG | SYSTOLIC BLOOD PRESSURE: 128 MMHG | HEIGHT: 63 IN | WEIGHT: 151.6 LBS

## 2018-03-02 DIAGNOSIS — I10 ESSENTIAL HYPERTENSION: Primary | ICD-10-CM

## 2018-03-02 DIAGNOSIS — E03.9 ACQUIRED HYPOTHYROIDISM: ICD-10-CM

## 2018-03-02 DIAGNOSIS — E78.2 MIXED HYPERLIPIDEMIA: ICD-10-CM

## 2018-03-02 PROCEDURE — G8417 CALC BMI ABV UP PARAM F/U: HCPCS | Performed by: FAMILY MEDICINE

## 2018-03-02 PROCEDURE — 99214 OFFICE O/P EST MOD 30 MIN: CPT | Performed by: FAMILY MEDICINE

## 2018-03-02 PROCEDURE — 1123F ACP DISCUSS/DSCN MKR DOCD: CPT | Performed by: FAMILY MEDICINE

## 2018-03-02 PROCEDURE — 1090F PRES/ABSN URINE INCON ASSESS: CPT | Performed by: FAMILY MEDICINE

## 2018-03-02 PROCEDURE — G8427 DOCREV CUR MEDS BY ELIG CLIN: HCPCS | Performed by: FAMILY MEDICINE

## 2018-03-02 PROCEDURE — G8598 ASA/ANTIPLAT THER USED: HCPCS | Performed by: FAMILY MEDICINE

## 2018-03-02 PROCEDURE — 4040F PNEUMOC VAC/ADMIN/RCVD: CPT | Performed by: FAMILY MEDICINE

## 2018-03-02 PROCEDURE — 1036F TOBACCO NON-USER: CPT | Performed by: FAMILY MEDICINE

## 2018-03-02 PROCEDURE — G8484 FLU IMMUNIZE NO ADMIN: HCPCS | Performed by: FAMILY MEDICINE

## 2018-03-02 ASSESSMENT — ENCOUNTER SYMPTOMS
DIARRHEA: 0
ABDOMINAL PAIN: 0
NAUSEA: 0
BLOOD IN STOOL: 0
VOMITING: 0

## 2018-03-02 ASSESSMENT — PATIENT HEALTH QUESTIONNAIRE - PHQ9
SUM OF ALL RESPONSES TO PHQ9 QUESTIONS 1 & 2: 0
1. LITTLE INTEREST OR PLEASURE IN DOING THINGS: 0
SUM OF ALL RESPONSES TO PHQ QUESTIONS 1-9: 0
2. FEELING DOWN, DEPRESSED OR HOPELESS: 0

## 2018-03-02 NOTE — PROGRESS NOTES
Prescriptions:     OXYGEN, Inhale 4 L into the lungs, Disp: , Rfl:     simvastatin (ZOCOR) 20 MG tablet, TAKE 1 TABLET EVERY EVENING, Disp: 90 tablet, Rfl: 3    levothyroxine (SYNTHROID) 100 MCG tablet, TAKE 1 TABLET DAILY, Disp: 90 tablet, Rfl: 3    amLODIPine (NORVASC) 5 MG tablet, TAKE 1 TABLET DAILY, Disp: 90 tablet, Rfl: 3    hydrochlorothiazide (HYDRODIURIL) 25 MG tablet, TAKE 1 TABLET DAILY, Disp: 90 tablet, Rfl: 3    aspirin 81 MG tablet, Take 81 mg by mouth daily, Disp: , Rfl:     Ascorbic Acid (VITAMIN C) 500 MG tablet, Take 500 mg by mouth daily, Disp: , Rfl:     Multiple Minerals-Vitamins (CALCIUM & VIT D3 BONE HEALTH PO), Take by mouth daily, Disp: , Rfl:     Multiple Vitamins-Minerals (MULTIVITAL PO), Take  by mouth.  , Disp: , Rfl:                 Review of Systems   Constitutional: Negative for appetite change, chills, fever and unexpected weight change. Cardiovascular: Negative for chest pain, palpitations and leg swelling. Gastrointestinal: Negative for abdominal pain, blood in stool, diarrhea, nausea and vomiting. Genitourinary: Negative for difficulty urinating, dysuria, frequency and hematuria. Neurological: Negative for dizziness and headaches. Objective:   Physical Exam   Constitutional: She appears well-developed and well-nourished. No distress. Eyes: No scleral icterus. Neck: No thyroid mass and no thyromegaly present. Cardiovascular: Normal rate and regular rhythm. Exam reveals no gallop and no friction rub. Murmur heard. Systolic murmur is present with a grade of 1/6   Murmur slight heard at the rusb  No edema legs   Pulmonary/Chest: Effort normal and breath sounds normal. No accessory muscle usage. No tachypnea. No respiratory distress. She has no decreased breath sounds. She has no wheezes. She has no rhonchi. She has no rales. occ ronchi noted at the bases   Air movement easy and full     Abdominal: Soft. Normal appearance.  She exhibits no pulsatile midline mass and no mass. There is no hepatosplenomegaly. There is no rigidity and no guarding. Lymphadenopathy:     She has no cervical adenopathy. Right: No supraclavicular adenopathy present. Left: No supraclavicular adenopathy present. Neurological: She is alert. She displays no tremor. Skin: Skin is warm, dry and intact. She is not diaphoretic. No pallor. Psychiatric: She has a normal mood and affect. Her speech is normal.       Assessment:       1. Essential hypertension     2. Mixed hyperlipidemia     3.  Acquired hypothyroidism         Seems to be doing well   She is told that ok for local for the teeth to be removed  I already communicated that to the dentist yesterday  If she needs any thing more will need to be cleared by dr Umesh Lee  They did not want to do blood now until they come back for the regular check  Discussed the lab being off slight on a liver test       Plan:      Ok for the local procedure  If more extensive needed then dr Umesh Lee will have to give the ok  See me back in the summer

## 2018-03-02 NOTE — PATIENT INSTRUCTIONS
Ok for the local procedure  If more extensive needed then dr Serena Ochoa will have to give the ok  See me back in the summer

## 2018-03-06 ENCOUNTER — HOSPITAL ENCOUNTER (OUTPATIENT)
Dept: CARDIAC REHAB | Age: 83
Discharge: HOME | End: 2018-03-07
Admitting: INTERNAL MEDICINE

## 2018-03-06 VITALS — BODY MASS INDEX: 26.73 KG/M2 | WEIGHT: 150.9 LBS

## 2018-03-08 ENCOUNTER — HOSPITAL ENCOUNTER (OUTPATIENT)
Dept: CARDIAC REHAB | Age: 83
Discharge: HOME | End: 2018-03-09
Admitting: INTERNAL MEDICINE

## 2018-03-08 VITALS — BODY MASS INDEX: 26.57 KG/M2 | WEIGHT: 150 LBS

## 2018-03-13 ENCOUNTER — HOSPITAL ENCOUNTER (OUTPATIENT)
Dept: CARDIAC REHAB | Age: 83
Discharge: HOME | End: 2018-03-14
Admitting: INTERNAL MEDICINE

## 2018-03-13 VITALS — WEIGHT: 148.1 LBS | BODY MASS INDEX: 26.23 KG/M2

## 2018-03-27 ENCOUNTER — HOSPITAL ENCOUNTER (OUTPATIENT)
Dept: CARDIAC REHAB | Age: 83
Discharge: HOME | End: 2018-03-28
Admitting: INTERNAL MEDICINE

## 2018-03-27 VITALS — BODY MASS INDEX: 25.67 KG/M2 | WEIGHT: 144.9 LBS

## 2018-03-27 NOTE — PROGRESS NOTES
Participated in education class on general healthy eating and nutrtition concepts. Participated in education class on weight management, portion control, weight reduction/weight gain.

## 2018-03-29 ENCOUNTER — HOSPITAL ENCOUNTER (OUTPATIENT)
Dept: CARDIAC REHAB | Age: 83
Discharge: HOME | End: 2018-03-30
Admitting: INTERNAL MEDICINE

## 2018-03-29 VITALS — BODY MASS INDEX: 25.69 KG/M2 | WEIGHT: 145 LBS

## 2018-04-01 ENCOUNTER — HOSPITAL ENCOUNTER (OUTPATIENT)
Dept: OTHER | Age: 83
Discharge: HOME | End: 2018-04-30
Attending: INTERNAL MEDICINE | Admitting: INTERNAL MEDICINE

## 2018-04-03 ENCOUNTER — HOSPITAL ENCOUNTER (OUTPATIENT)
Dept: CARDIAC REHAB | Age: 83
Discharge: HOME | End: 2018-04-04
Admitting: INTERNAL MEDICINE

## 2018-04-03 VITALS — BODY MASS INDEX: 25.46 KG/M2 | WEIGHT: 143.7 LBS

## 2018-04-05 ENCOUNTER — HOSPITAL ENCOUNTER (OUTPATIENT)
Dept: CARDIAC REHAB | Age: 83
Discharge: HOME | End: 2018-04-06
Admitting: INTERNAL MEDICINE

## 2018-04-10 ENCOUNTER — HOSPITAL ENCOUNTER (OUTPATIENT)
Dept: CARDIAC REHAB | Age: 83
Discharge: HOME | End: 2018-04-11
Admitting: INTERNAL MEDICINE

## 2018-04-10 VITALS — BODY MASS INDEX: 25.44 KG/M2 | WEIGHT: 143.6 LBS

## 2018-04-12 ENCOUNTER — HOSPITAL ENCOUNTER (OUTPATIENT)
Dept: CARDIAC REHAB | Age: 83
Discharge: HOME | End: 2018-04-13
Admitting: INTERNAL MEDICINE

## 2018-04-17 ENCOUNTER — HOSPITAL ENCOUNTER (OUTPATIENT)
Dept: CARDIAC REHAB | Age: 83
Discharge: HOME | End: 2018-04-18
Admitting: INTERNAL MEDICINE

## 2018-04-17 VITALS — BODY MASS INDEX: 25.33 KG/M2 | WEIGHT: 143 LBS

## 2018-04-19 ENCOUNTER — HOSPITAL ENCOUNTER (OUTPATIENT)
Dept: CARDIAC REHAB | Age: 83
Discharge: HOME | End: 2018-04-20
Admitting: INTERNAL MEDICINE

## 2018-04-19 VITALS — WEIGHT: 144 LBS | BODY MASS INDEX: 25.51 KG/M2

## 2018-04-19 NOTE — PROGRESS NOTES
Disturbances No     Edema   No     Dyspnea  Yes/ Exertional    Oxygen Therapy:      Home O2    lpm  []  Prn  [] continuous  []  HS     [] CPAP []  BiPAP     Company:          Comments:    Occupational/ Recreational Activities:    Employment Status:  []  FT  []  PT  [] Disabled [x] Retired    Comments:       Occupation: Clerical          Hobbies/Leisure activities: Crosswords Puzzles, Reading    Social/Spiritual:        Social History     Social History    Marital status:      Spouse name: N/A    Number of children: N/A    Years of education: N/A     Social History Main Topics    Smoking status: Former Smoker     Packs/day: 1.00     Years: 20.00     Quit date: 12/1/1992    Smokeless tobacco: Never Used    Alcohol use Yes      Comment: occ    Drug use: No    Sexual activity: Not on file     Other Topics Concern    Not on file     Social History Narrative    No narrative on file          Do you live alone: []  Alone [x]  with spouse/family       Do you have stairs in your home  []  no [x]  yes        Comment: 13 stairs to basement laundry       Transportation  [x]  drive self []  family/friend     Comment:       Cultural/Spiritual Influences: (Oriental orthodox groups, etc)       Any Cultural or Hinduism practices we should be aware of?  No                    Fall Risk Assessment:      []  Cognitive Impairment []  Balance/Gait Disturbance   []  Fall History   []  Visual Difficulty   []  Dizziness   []  Other Comments:     Physical Assessment:    Color: [x]  natural []  pale [] cyanotic []  flushed []  jaundice    Skin Integrity [x]  intact [] other:    Heart Rate 91                       Resp Rate 18      Breath Sounds: Faint bibasilar crackles    Blood Pressures Sitting: Rt arm 138/76       Resting SpO2: 93%  Resp effort/pattern: Easy/Regular      Peripheral pulses: Yes    Edema:  No    Numbness/tingling:  No    Orthopedic/exercise limitations:  No      Psychosocial assessment:    Support System:Spouse & physical   fitness (PF)?:   7/10        -30 day PF goal:  8/10    EDUCATION  [] Understands proper set/up O2 use  []  Understands SpO2 and RPD? [] Aerobic progression explained? []  Intensity progression explained? GOALS                                                                                         Rate your physical   fitness (PF)?:   /10        -30 day PF goal:  /10    EDUCATION   []  Home Activity education offered  [] Stretching/ Flexibility education offered  [] Attended Benefits of Exercise Class  []  Attended Resistance Training Class                                                    GOALS        1. Yes    2. Yes    3. Yes    4. Yes                                                                                     Rate your physical   fitness (PF)?:   7/10    -30 day PF goal: 8/10    EDUCATION  [] Attended all individually relevant exercise education sessions? All but resistance training  []  Knows current exercise goals and recmndtns?:                                                  Goals Achieved? Rate your physical fitness now?:     /10  Handgrip:  Right:  Left:    Discharge  EDUCATION  []  Attended all individually relevant exercise education sessions?    [] Knows current exercise goals and recmndtns?:                                                                                        PHYSICIAN DIRECTED   EXERCISE RX     RPE : 11 - 14  Titrate O2 to keep SpO2 >89%    Frequency (F): 2-3x/wk in Rehab,         Initial Intensity : 2.3 METs (from 6MWT)   1.3-1.8 (60-80% of walk speed for TM)    Type (T): Aerobic (TM,NS, SF, AE, AB, RB, EL, Arc), RT 1-3#, 8-16 reps    CAN USE ALL EQUIPMENT EXCEPT       Time (Ti): Aerobic:   15-40 min               Progression: 1-2 min total time for TM, AB, NS, SF or Arm ergometer per session or when a steady state has occurred in RPE if  SpO2 and HR levels are 92       (X) UCSD SOB score: 31         PHQ-9 Score: If score >or =15, Action:     SF-36 Mental Score:     UCSD Score: Any action on Screening Tools?:                  Psychosocial Screening   Tools    Repeat PHQ-9 Score if vannessa:    Repeat SF-36      Repeat UCSD SOB Score:       Assessment  []  S/S of depression identified? No    []  PCP notified of PHQ-9 results      []  On Anti-anxiety / anti-depression meds?: NO           Intervention  If S/S of depression present, action taken:     Is the patient receiving support for the ID program at home?:  [x]  Y    [] N    Is the patient tolerating the intensity and duration of the ID program?:     [x] Y   [] N Re-Assessment  [] S/S of depression present? If [x] , action:         Med Changes?:   [] Y   [] N        Is the patient tolerating the intensity and duration of the ID program?:    []  Y    [] N  Re-Assessment  Psych Issue notes:        Is the patient receiving support for the ID program at home?:  [x]  Y  [] N    Is the patient tolerating the intensity and duration of the ID program?:  [x]  Y   []  N     Final Assessment                       Results of any Physician/ Counselor Intervention?:     [] Y   [] N         GOALS        30 DAY TARGET GOAL    [x]  Assess Mental Score using   SF-36 and PHQ-9   [x]  Teach PLB  [x]  Reassure pt that (s)he can stop and rest, adjust the speeds, and/or switch modalities from our suggestions  -    (0 being 'None', 10 being 'Very'),  -How would you rate your Anxiety Level: 1      -How would you rate your level of  depression: 0    -How would you rate your mood: (0 is negative all the time, 10 is positive all the time):  10          ADL's  Assess PF score on SF-36    Score= 30    From UCSD, ADL pt struggles with most: Walking up stairs/hill    Out of 10, rate your ability to do that ADL now.   4     GOALS        Did pt meet Initial 30 day   Target Goal(s)?:       30 DAY NEW TARGET GOAL    [] Decrease/Maintain anxiety and depression level  [] Increase ability to complete ADL  [] Encourage pt to rate exercises truthfully to encourage correct intensity / duration prescription  -  (0 being 'None', 10 being 'Very'),  -How would you rate your Anxiety Level:1      -How would you rate your level of depression: 0      -How would you rate your mood:10    ADLS: 0-0/82 GOALS        Did pt meet previous 30 day Target Goal(s)?:      RE- ASSESSMENT GOAL    [] Decrease/  Maintain anxiety and depression level  [] Increase ability to complete ADL  -          (0 being 'None', 10 being 'Very'),  -How would you rate your Anxiety Level:      -How would you rate your level of depression:    -How would you rate your mood: GOALS        Did pt meet previous 30 day Target Goal(s)?:      RE- ASSESSMENT GOAL    [] Decrease/  Maintain anxiety and depression level  [] Increase ability to complete ADL  -          (0 being 'None', 10 being 'Very'),  -How would you rate your Anxiety Level: 0      -How would you rate your level of depression: 0    -How would you rate your mood: 10        ADLs: 7-8/10; steps are difficult     Discharge            Did pts SF-36 Mental Score increase?:          [] Pt is aware of the s/s of depression    [] Received Psychosocial Education    Any follow up with physicians  necessary?:      [] Y    [] N               [facility] INDIVIDUAL TREATMENT PLAN  EDUCATION DOMAIN:    EDUCATION   Initial Assessment  Day 1 EDUCATION   Intervention  Day 2-30 EDUCATION   Re-Assessment  Day 31-60 EDUCATION   ReAssessment Day 61-90+ EDUCATION Discharge  Final Day                      Education  [x] Equipment/Staff Orientation  [x] Breathing Retraining  [x] Importance of Clean Hands    Chronic Cough?:   [] Y   [x]  N  Spacer?:   []   Y   [x]  N    Sleep Apnea?:  [] Y    [x] N     [x] Education Book given       Education  Individual instruction  [] PLB  [] RPD/RPE   [] O2 use  []  review PFT  [] Inhalers   []Home Activity/Warm up/ stretches  Attend currently does not wear oxygen at home but was noted to be hypoxic during her 6mw, requiring 4lpm with exertion. She will attend [place] twice weekly. Hayder Garrison will attend 28 sessions of [place] Phase 2     Exercise: Hayder aGrrison will participate in 15-45 min of aerobic exercise, time and intensity to be determined base on her RPE. Oxygen: 4lpm during exercise,  titrate to maintain Spo2 >89%    Medications: N/A    Nutrition: Wt. 154.7, BMI 29.2. She will attend General Nutrition class during [place]. Psychosocial including Dyspnea with ADL's, Depression/Anxiety and Social Functioning: Hayder Garrison lives at home with . She is able to perform light household duties but does have a cleaning service bi-weekly. Her  does the grocery shopping. She states she is unable to walk for long periods of time due to her shortness of breath. She must stop frequently and rest. She denies problems with anxiety, rating her level a 1/10 and states she has no depression. Other:    30 Day Report: 2/16/18:    Attendance: Has completed 9 exercise sessions and 1 education class. Exercise: Exercises for 40 min at low to moderate levels. May add TM if tolerated. Nutrition: Weight= 154.4lbs- wants to maintain  Oxygen:Uses at 4 lpm for exercise. SaO2 levels above 90% during exercise. Psychosocial: Minimal anxiety, no depression, and mostly positive attitude. Medications:Takes as prescribed. Other: States she is feeling like she is improving in ability to perform ADLs. Dorrene Galileo, MEd    3/20/18- 60 Day Report: Hayder Garrison has completed 16 exercise sessions and 3 education classes. She exercises for 40 minutes at low levels. SaO2 levels with O2 at 4lpm are 90% and above. She is not present today to record additional data because of recovering from oral surgery. Weight = 148.1lbs - Dorrene Galileo, MEd    4/18/18: 90 Day Report:   Attendance: Has completed 24 exercise sessions and 6 of 8 education classes.   Exercise: Exercises 40

## 2018-04-19 NOTE — PROGRESS NOTES
[facility] Facility-Based Therapy for COPD  INDIVIDUAL TREATMENT PLAN (ITP)     NAME: Satinder Herr. O.B: 3/12/1928  Account Number: [de-identified]  AGE: 80 y.o. Diagnosis: Swyer-Wilver Syndrome/Pulmonary Fibrosis  PFT: 12/11/17  FEV1/FVC actual: 67%, FEV1: 103% FVC: 112%   Notes: Medicare requirements for Pulmonary Rehabilitation include a PFT within the last 12 months revealing: FEV1/FVC <70, and FEV1 <80%, and documentation from the referring physician stating that the patient has quit smoking or will participate in smoking cessation activities prior to, or during the Pulmonary Rehab program.  A 6 Minute Walk Test (6 MWT) at the beginning and end of the program is also indicated.   GLOSSARY: PF= Physical Fitness  TM=Treadmill  AD= Schwinn AirDyne Bike  UBE=Upperbody Ergometry LBE=Lowerbody Ergometer  NS=NuStep SF= SciFit  ST=Step Training  RT=Resistance Training   PLB=Pursed Lip Breathing   DY= Dynabands  HW= Handweights   Cybex RT= Cybex Mult istation weight machine   RB=Recumbent    REFERRING PHYSICIAN: Dr. Josseline Delacruz History:     Past Medical History:   Diagnosis Date    Abnormal EKG     Atrial arrhythmia     Echo 09 LVEF 65-70%    Breast cancer (HCC)     s/p mastectomy    COPD (chronic obstructive pulmonary disease) (HCC)     Hyperlipidemia     managed by PCP    Hypothyroidism     managed by PCP    Preop cardiovascular exam     prior to having cataract surgery       Surgical History:     Past Surgical History:   Procedure Laterality Date    CATARACT REMOVAL Bilateral     COLONOSCOPY  07/20/2010    normal dr Kathie Johnson    radical mastectomy    OVARY REMOVAL Bilateral 1965    done in order to reduce estrogen stimulation    TONSILLECTOMY AND ADENOIDECTOMY         Major Symptoms:     Cough/sputum  No      Wheezing  No     Chest Discomfort  No     Orthopnea  No     Sleep above            ACHIEVED TOTAL AEROBIC EXERCISE TIME:  min         FINAL LEVELS:  [] TM: min  [] Nustep: level  min  [] Arm ergometer: baraajs min  [] Scifit: level min  [] HW :  # x  reps  [] Dy:    X   reps  [] Cybex RT:    # x   Reps  [] Eliptical:level  X  Min  [] Arc: level   X    mins  [] RB:  Level  X   mins  [] AB: level   X     Min              Did pt. progress in rehab?:     30 DAY TARGET GOAL  [x] Start slowly but progress each week  [x] Increase duration on the equipment  [x] Start resistance training    -30 day PF goal: 7/10                 Current Home Exercise : None    Frequency:      Type:                     Health Knowledge   Test Score: N/A       Current Home Exercise: NONE- To add stretching and chair stands  Frequency:      Type:         Time:  min                                  Current Home Exercise:   Frequency:       Type:         Time:  min                                Current Home Exercise: Warmups and chair stands  Frequency:       Type:         Time:  min                                                      Discharge exercise plan                                  Repeat Health Knowledge Test Score :    /25     [facility] INDIVIDUAL TREATMENT PLAN  SMOKING AND   OTHER COMPONENTS    Smoking/Other  Components   Initial Assessment  Day 1 Smoking/Other  Components  Intervention  Day 2-30 Smoking/Other  Components  Re-Assessment  Day 31-60 Smoking/Other  Components  Re-Assessment Day 61-90+ Smoking/Other  Components  Discharge  Final Day   Tobacco Use Hx  [x] Y  [] N?:   Quit Date: 1992  Cig/Day: 20  Years: 20  Tobacco Use  Any change in Smoking Status?:  No  [x]  not smoking  Quit Date:   (if applicable)  Intervention  []  Information/ed material given on cessation techniques  []  smoking cessation class schedule given Tobacco Use  Any change in Smoking Status?:  NA    Quit Date:   (if applicable)          Intervention  [] Referral to Tobacco Cessation Specialist (TCS) Tobacco Use  Any system  [x]  Compliant with use as prescribed  []  Learn O2 safety guidelines           Medications  []  Uses as prescribed  [] Non- compliant with prescribed meds    Respiratory Medications    []  Proper use and technique of MDI, DPI and/or nebs  [] Incorrect use and technique of MDI, DPI and /or nebs    Hypoxemia  Problem:      Current Oxygen Prescription:    l/min rest   l/min exercise   titration plan/weaning plan:    Goals:   []  Manage Hypoxemia  []  receive adequate portable system  []  Compliant with use as prescribed  [] Learn O2 safety guidelines           Medications  [x]  Uses as prescribed  []  Non- compliant with prescribed meds    Respiratory Medications    []  Proper use and technique of MDI, DPI and/or nebs  []  Incorrect use and technique of MDI, DPI and /or nebs    Hypoxemia  Problem:      Current Oxygen Prescription:  l/min rest  4 l/min exercise   titration plan/weaning plan:maintain 4lpm at night  Goals:   []  Manage Hypoxemia  []  receive adequate portable system  [] Compliant with use as prescribed  []  Learn O2 safety guidelines     Medications    [] Compliant  []  Noncompliant       Respiratory Medications    []  Compliant  [] Noncompliant              Hypoxemia  Problem:    []  Compliant  []  Noncompliant    Final Oxygen Prescription:    l/min rest   l/min exercise                                                           [facility] INDIVIDUAL TREATMENT PLAN  NUTRITION DOMAIN:        NUTRITION   Initial Assessment  Day 1 NUTRITION   Intervention  Day 2-30 NUTRITION   Re-Assessment  Day 31-60 NUTRITION   Re-Assessment Day 61-90+ NUTRITION Discharge  Final Day   Weight Management:  154.7# lbs  Current BMI: 29.2 Weight Management:  154.4 lbs  Current BMI Weight Management:    lbs  Current BMI Weight Management:   144 lbs  Current BMI Weight Management:    lbs  Current BMI   Diabetes?: No  A1C   Fasting BS:   Insulin?:    Oral agent?     Diabetes:  If y, has patient seen a positive trend in FBS?:      Intervention    [x] Basic nutrition education   [] Referral to Diabetes Education? [] Referral to weightloss/nutrition education     Intervention    [] Pt has had Nutrition class? [] Informal questions asked regarding nutrition/weight control Intervention    []  Pt has had Nutrition class? [] Questions addressed Intervention    [x]  Pt has had Nutrition class?    Intervention    Pt aware of:     [] Pulmonary eating techniques   [] Smart choices, Calories   []Gas-producing foods   [] Wt gain / loss strategies     GOALS    Weight Loss         30 DAY TARGET GOAL:    [] Decrease portion size by 250-500 calories/day  [] Increase fluid intake to 6-8 8oz.  [] Eat less sweets      Reasonable, achievable 30 day weight goal:  lbs   (1-2 lb per week is recommended)            Weight Gain        30 day   Target Goal:    [] increase proteins  [] add nutrition  Supplement  [] 6 small meals      Did pt meet Initial 30 day Target Goal(s)?:     New 30 DAY TARGET GOAL:    [] Decrease saturated fats  [] Decrease gas producing  cruciferous veggies   -  Reasonable, achievable 30 day weight goal:  MAINTAIN   Did pt meet previous 30 day Target   Goal(s)?:     New 30 DAY TARGET GOAL:    [] Switch to whole grains whenever possible  [] Decrease/ Eliminate carbonated beverages    Reasonable, achievable 30 day weight goal:    Did pt meet previous 30 day Target   Goal(s)?:      New 30 DAY TARGET GOAL:    [] Smaller meals more frequently  [] Decrease portion sizes by 25%      Reasonable, achievable 30 day weight goal:  MAINTAIN                 Did pt meet previous 30 day Target   Goal(s)?:                         [facility] INDIVIDUAL TREATMENT PLAN  PSYCHOSOCIAL DOMAIN:    Psychosocial   Initial Assessment  Day 1 Psychosocial   Intervention  Day 2-30 Psychosocial  Re-Assessment  Day 31-60 Psychosocial   Re-Assessment  Day 61-90+ Psychosocial Discharge  Final Day   Psychosocial Screening Tools    (X) PHQ-9 score: 0      (X) SF-36: currently does not wear oxygen at home but was noted to be hypoxic during her 6mw, requiring 4lpm with exertion. She will attend [place] twice weekly. Loev Carrasco will attend 28 sessions of [place] Phase 2     Exercise: Love Carrasco will participate in 15-45 min of aerobic exercise, time and intensity to be determined base on her RPE. Oxygen: 4lpm during exercise,  titrate to maintain Spo2 >89%    Medications: N/A    Nutrition: Wt. 154.7, BMI 29.2. She will attend General Nutrition class during [place]. Psychosocial including Dyspnea with ADL's, Depression/Anxiety and Social Functioning: Love Carrasco lives at home with . She is able to perform light household duties but does have a cleaning service bi-weekly. Her  does the grocery shopping. She states she is unable to walk for long periods of time due to her shortness of breath. She must stop frequently and rest. She denies problems with anxiety, rating her level a 1/10 and states she has no depression. Other:    30 Day Report: 2/16/18:    Attendance: Has completed 9 exercise sessions and 1 education class. Exercise: Exercises for 40 min at low to moderate levels. May add TM if tolerated. Nutrition: Weight= 154.4lbs- wants to maintain  Oxygen:Uses at 4 lpm for exercise. SaO2 levels above 90% during exercise. Psychosocial: Minimal anxiety, no depression, and mostly positive attitude. Medications:Takes as prescribed. Other: States she is feeling like she is improving in ability to perform ADLs. Beto Rdz, MEd    3/20/18- 60 Day Report: Love Carrasco has completed 16 exercise sessions and 3 education classes. She exercises for 40 minutes at low levels. SaO2 levels with O2 at 4lpm are 90% and above. She is not present today to record additional data because of recovering from oral surgery. Weight = 148.1lbs - Beto Rdz, MEd    4/18/18: 90 Day Report:   Attendance: Has completed 24 exercise sessions and 6 of 8 education classes.   Exercise: Exercises 40 minutes at low to moderate levels. Performs warmup stretching and chair stands at home on \"off\" rehab days. Nutrition: Weight= 144lbs; Lost weight due to mouth surgery on 3/15/18. Oxygen: Required at 4lpm to maintain SaO2 levels in the mid-upper 90s. Psychosocial: Reports no anxiety nor depression and very positive mood. Medications: Takes as prescribed.   Hedy Duverney, MEd               Referring Physician: Kar Contreras                           Fax #: [phone]    Reviewed and electronically signed by Dr Bret Yusuf

## 2018-04-24 ENCOUNTER — HOSPITAL ENCOUNTER (OUTPATIENT)
Dept: CARDIAC REHAB | Age: 83
Discharge: HOME | End: 2018-04-25
Admitting: INTERNAL MEDICINE

## 2018-04-24 VITALS — BODY MASS INDEX: 25.28 KG/M2 | WEIGHT: 142.7 LBS

## 2018-04-26 ENCOUNTER — TELEPHONE (OUTPATIENT)
Dept: FAMILY MEDICINE CLINIC | Age: 83
End: 2018-04-26

## 2018-04-26 ENCOUNTER — HOSPITAL ENCOUNTER (OUTPATIENT)
Dept: CARDIAC REHAB | Age: 83
Discharge: HOME | End: 2018-04-27
Admitting: INTERNAL MEDICINE

## 2018-04-26 VITALS — BODY MASS INDEX: 25.51 KG/M2 | WEIGHT: 144 LBS

## 2018-04-26 DIAGNOSIS — Z85.3 PERSONAL HISTORY OF MALIGNANT NEOPLASM OF BREAST: Primary | ICD-10-CM

## 2018-04-30 ENCOUNTER — TELEPHONE (OUTPATIENT)
Dept: FAMILY MEDICINE CLINIC | Age: 83
End: 2018-04-30

## 2018-04-30 DIAGNOSIS — E78.2 MIXED HYPERLIPIDEMIA: ICD-10-CM

## 2018-04-30 DIAGNOSIS — I10 ESSENTIAL HYPERTENSION: Primary | ICD-10-CM

## 2018-05-01 ENCOUNTER — HOSPITAL ENCOUNTER (OUTPATIENT)
Dept: OTHER | Age: 83
Discharge: HOME | End: 2018-05-31
Attending: INTERNAL MEDICINE | Admitting: INTERNAL MEDICINE

## 2018-05-01 ENCOUNTER — HOSPITAL ENCOUNTER (OUTPATIENT)
Dept: CARDIAC REHAB | Age: 83
Discharge: HOME | End: 2018-05-02
Admitting: INTERNAL MEDICINE

## 2018-05-01 VITALS — BODY MASS INDEX: 25.15 KG/M2 | WEIGHT: 142 LBS

## 2018-05-03 ENCOUNTER — HOSPITAL ENCOUNTER (OUTPATIENT)
Dept: CARDIAC REHAB | Age: 83
Discharge: HOME | End: 2018-05-04
Admitting: INTERNAL MEDICINE

## 2018-05-03 VITALS — WEIGHT: 141 LBS | BODY MASS INDEX: 24.98 KG/M2

## 2018-05-03 PROCEDURE — 94618 PULMONARY STRESS TESTING: CPT | Performed by: INTERNAL MEDICINE

## 2018-05-03 NOTE — PROGRESS NOTES
[facility] Cardiopulmonary Rehabilitation   Six Minute Walk Test    Magan PERES O.B: 3/12/1928  Account Number: [de-identified]  AGE: 80 y.o.     OP test []   Pulmonary Rehab PRE []  POST   [x]    Diagnosis: Sandy Dunaway      Referring Physician  Dr Farrukh Aragon    Gender []  male [x] female AGE:89 Race:     Height:5 ft 1 in 155 cm    Weight:141 lbs  64 kg  Blood Pressure 120/70    Medications affecting heart rate:  Amlodipine 5 mg daily      Supplemental O2 during the test []  no [x] yes 4 lpm     [x] continuous []  intermittent    Device : [x] NC []  HFNC    []  oximizer  [] mask      Laps completed: 7 (1 lap = 100 ft)        Baseline (resting) Walking End of Test (recovery)      Heart Rate 96   126  100    BP  120/70   148/78  122/72    Dyspnea 0   5  0 (Bulmaro scale)    Fatigue 0   6  4 (Bulmaro scale)    SpO2  98%   89%  97%    Stopped or paused before 6 mins? []  no [x] yes How long? 2 stops: 30 sec each=1 min total    Symptoms at end of exercise:[] angina  [] dizziness  []  hip, leg, calf, back pain       []  no complaints [x]  Other fatigue    Number of laps 7 (x 30.48 meters) 213 + final partial lap: --     Total distance walked in 6 mins: 213 meters    Predicted distance: 327 meters  Percent predicted: 65%              [x] normal       [] reduced     Initial data: Ambulated 319 meters/900 ft for 86% predicted (normal) Lowest SaO2 90% on 4lpm no stops   Final data: Ambulated 213 meters/700 ft for 65% predicted (normal) Lowest SaO2 89% on 4lpm  2 stops for 1 min total    Summary: This is a post-rehab test, performed on 4 liters of supplemental oxygen. The patient ambulated 213 meters which is normal-65% predicted.   Her  heart rate response was normal, the blood pressure response was normal, oxygen saturations were normal.  The patient desaturated to 89% while ambulating on 4 liters of oxygen  The degree of symptoms based on the Bulmaro Dyspnea/Fatigue scale were increased with testing. This test does indicate the continued need for supplemental oxygen. May need higher flow rates when walking long distances. When compared to the pre-rehab walk test, she ambulated 106 meters less. This is a significant decline in distance walked.             Phares Gosselin, DO  Pulmonary Rehab Director

## 2018-05-04 DIAGNOSIS — I10 ESSENTIAL HYPERTENSION: Primary | ICD-10-CM

## 2018-05-08 ENCOUNTER — OFFICE VISIT (OUTPATIENT)
Dept: PULMONOLOGY | Age: 83
End: 2018-05-08

## 2018-05-08 ENCOUNTER — TELEPHONE (OUTPATIENT)
Dept: PULMONOLOGY | Age: 83
End: 2018-05-08

## 2018-05-08 VITALS
OXYGEN SATURATION: 91 % | DIASTOLIC BLOOD PRESSURE: 64 MMHG | RESPIRATION RATE: 16 BRPM | WEIGHT: 149 LBS | SYSTOLIC BLOOD PRESSURE: 118 MMHG | TEMPERATURE: 97.6 F | HEART RATE: 92 BPM | BODY MASS INDEX: 26.4 KG/M2 | HEIGHT: 63 IN

## 2018-05-08 DIAGNOSIS — J43.0 SWYER-JAMES SYNDROME (HCC): Primary | ICD-10-CM

## 2018-05-08 DIAGNOSIS — J96.11 CHRONIC HYPOXEMIC RESPIRATORY FAILURE (HCC): ICD-10-CM

## 2018-05-08 PROCEDURE — G8417 CALC BMI ABV UP PARAM F/U: HCPCS | Performed by: INTERNAL MEDICINE

## 2018-05-08 PROCEDURE — 1036F TOBACCO NON-USER: CPT | Performed by: INTERNAL MEDICINE

## 2018-05-08 PROCEDURE — G8926 SPIRO NO PERF OR DOC: HCPCS | Performed by: INTERNAL MEDICINE

## 2018-05-08 PROCEDURE — 3023F SPIROM DOC REV: CPT | Performed by: INTERNAL MEDICINE

## 2018-05-08 PROCEDURE — 1090F PRES/ABSN URINE INCON ASSESS: CPT | Performed by: INTERNAL MEDICINE

## 2018-05-08 PROCEDURE — 4040F PNEUMOC VAC/ADMIN/RCVD: CPT | Performed by: INTERNAL MEDICINE

## 2018-05-08 PROCEDURE — 99213 OFFICE O/P EST LOW 20 MIN: CPT | Performed by: INTERNAL MEDICINE

## 2018-05-08 PROCEDURE — G8598 ASA/ANTIPLAT THER USED: HCPCS | Performed by: INTERNAL MEDICINE

## 2018-05-08 PROCEDURE — 1123F ACP DISCUSS/DSCN MKR DOCD: CPT | Performed by: INTERNAL MEDICINE

## 2018-05-08 PROCEDURE — G8427 DOCREV CUR MEDS BY ELIG CLIN: HCPCS | Performed by: INTERNAL MEDICINE

## 2018-06-01 ENCOUNTER — HOSPITAL ENCOUNTER (OUTPATIENT)
Dept: OTHER | Age: 83
Discharge: HOME | End: 2018-06-30
Attending: INTERNAL MEDICINE | Admitting: INTERNAL MEDICINE

## 2018-06-14 DIAGNOSIS — I10 ESSENTIAL HYPERTENSION: ICD-10-CM

## 2018-06-14 DIAGNOSIS — E78.2 MIXED HYPERLIPIDEMIA: ICD-10-CM

## 2018-06-14 LAB
A/G RATIO: 1.1 (ref 1.1–2.2)
ALBUMIN SERPL-MCNC: 3.9 G/DL (ref 3.4–5)
ALP BLD-CCNC: 103 U/L (ref 40–129)
ALT SERPL-CCNC: 14 U/L (ref 10–40)
ANION GAP SERPL CALCULATED.3IONS-SCNC: 12 MMOL/L (ref 3–16)
AST SERPL-CCNC: 21 U/L (ref 15–37)
BILIRUB SERPL-MCNC: 0.4 MG/DL (ref 0–1)
BUN BLDV-MCNC: 12 MG/DL (ref 7–20)
CALCIUM SERPL-MCNC: 9.8 MG/DL (ref 8.3–10.6)
CHLORIDE BLD-SCNC: 104 MMOL/L (ref 99–110)
CHOLESTEROL, TOTAL: 152 MG/DL (ref 0–199)
CO2: 28 MMOL/L (ref 21–32)
CREAT SERPL-MCNC: <0.5 MG/DL (ref 0.6–1.2)
GFR AFRICAN AMERICAN: >60
GFR NON-AFRICAN AMERICAN: >60
GLOBULIN: 3.6 G/DL
GLUCOSE BLD-MCNC: 90 MG/DL (ref 70–99)
HCT VFR BLD CALC: 39 % (ref 36–48)
HDLC SERPL-MCNC: 61 MG/DL (ref 40–60)
HEMOGLOBIN: 13.2 G/DL (ref 12–16)
LDL CHOLESTEROL CALCULATED: 74 MG/DL
MAGNESIUM: 2.1 MG/DL (ref 1.8–2.4)
MCH RBC QN AUTO: 31.8 PG (ref 26–34)
MCHC RBC AUTO-ENTMCNC: 33.7 G/DL (ref 31–36)
MCV RBC AUTO: 94.3 FL (ref 80–100)
PDW BLD-RTO: 15 % (ref 12.4–15.4)
PLATELET # BLD: 371 K/UL (ref 135–450)
PMV BLD AUTO: 7.7 FL (ref 5–10.5)
POTASSIUM SERPL-SCNC: 3.8 MMOL/L (ref 3.5–5.1)
RBC # BLD: 4.14 M/UL (ref 4–5.2)
SODIUM BLD-SCNC: 144 MMOL/L (ref 136–145)
TOTAL PROTEIN: 7.5 G/DL (ref 6.4–8.2)
TRIGL SERPL-MCNC: 87 MG/DL (ref 0–150)
VLDLC SERPL CALC-MCNC: 17 MG/DL
WBC # BLD: 6.1 K/UL (ref 4–11)

## 2018-06-18 RX ORDER — LEVOTHYROXINE SODIUM 0.1 MG/1
TABLET ORAL
Qty: 90 TABLET | Refills: 1 | Status: SHIPPED | OUTPATIENT
Start: 2018-06-18 | End: 2018-12-26 | Stop reason: SDUPTHER

## 2018-06-18 RX ORDER — HYDROCHLOROTHIAZIDE 25 MG/1
TABLET ORAL
Qty: 90 TABLET | Refills: 1 | Status: SHIPPED | OUTPATIENT
Start: 2018-06-18 | End: 2018-12-11 | Stop reason: SDUPTHER

## 2018-06-19 ENCOUNTER — OFFICE VISIT (OUTPATIENT)
Dept: FAMILY MEDICINE CLINIC | Age: 83
End: 2018-06-19

## 2018-06-19 VITALS
WEIGHT: 140.4 LBS | HEART RATE: 76 BPM | SYSTOLIC BLOOD PRESSURE: 104 MMHG | DIASTOLIC BLOOD PRESSURE: 60 MMHG | TEMPERATURE: 97.6 F | HEIGHT: 63 IN | BODY MASS INDEX: 24.88 KG/M2

## 2018-06-19 DIAGNOSIS — E03.9 ACQUIRED HYPOTHYROIDISM: ICD-10-CM

## 2018-06-19 DIAGNOSIS — E78.2 MIXED HYPERLIPIDEMIA: Primary | ICD-10-CM

## 2018-06-19 PROCEDURE — G8427 DOCREV CUR MEDS BY ELIG CLIN: HCPCS | Performed by: FAMILY MEDICINE

## 2018-06-19 PROCEDURE — 99213 OFFICE O/P EST LOW 20 MIN: CPT | Performed by: FAMILY MEDICINE

## 2018-06-19 PROCEDURE — 1090F PRES/ABSN URINE INCON ASSESS: CPT | Performed by: FAMILY MEDICINE

## 2018-06-19 PROCEDURE — 1123F ACP DISCUSS/DSCN MKR DOCD: CPT | Performed by: FAMILY MEDICINE

## 2018-06-19 PROCEDURE — 4040F PNEUMOC VAC/ADMIN/RCVD: CPT | Performed by: FAMILY MEDICINE

## 2018-06-19 PROCEDURE — G8598 ASA/ANTIPLAT THER USED: HCPCS | Performed by: FAMILY MEDICINE

## 2018-06-19 PROCEDURE — G8420 CALC BMI NORM PARAMETERS: HCPCS | Performed by: FAMILY MEDICINE

## 2018-06-19 PROCEDURE — 1036F TOBACCO NON-USER: CPT | Performed by: FAMILY MEDICINE

## 2018-06-19 ASSESSMENT — ENCOUNTER SYMPTOMS
NAUSEA: 0
VOMITING: 0
CONSTIPATION: 0
ABDOMINAL PAIN: 0
DIARRHEA: 0
ABDOMINAL DISTENTION: 0
SHORTNESS OF BREATH: 0
CHEST TIGHTNESS: 0
COUGH: 0
BLOOD IN STOOL: 0

## 2018-08-21 ENCOUNTER — TELEPHONE (OUTPATIENT)
Dept: FAMILY MEDICINE CLINIC | Age: 83
End: 2018-08-21

## 2018-09-13 ENCOUNTER — HOSPITAL ENCOUNTER (OUTPATIENT)
Dept: WOMENS IMAGING | Age: 83
Discharge: HOME | End: 2018-09-13
Attending: FAMILY MEDICINE | Admitting: FAMILY MEDICINE

## 2018-09-13 DIAGNOSIS — J84.10 PULMONARY FIBROSIS (HCC): ICD-10-CM

## 2018-09-13 DIAGNOSIS — Z12.31 VISIT FOR SCREENING MAMMOGRAM: ICD-10-CM

## 2018-09-19 RX ORDER — AMLODIPINE BESYLATE 5 MG/1
TABLET ORAL
Qty: 90 TABLET | Refills: 1 | Status: SHIPPED | OUTPATIENT
Start: 2018-09-19 | End: 2019-03-25

## 2018-10-11 ENCOUNTER — TELEPHONE (OUTPATIENT)
Dept: FAMILY MEDICINE CLINIC | Age: 83
End: 2018-10-11

## 2018-11-05 ENCOUNTER — OFFICE VISIT (OUTPATIENT)
Dept: PULMONOLOGY | Age: 83
End: 2018-11-05
Payer: MEDICARE

## 2018-11-05 VITALS
DIASTOLIC BLOOD PRESSURE: 76 MMHG | HEIGHT: 63 IN | SYSTOLIC BLOOD PRESSURE: 126 MMHG | BODY MASS INDEX: 24.8 KG/M2 | TEMPERATURE: 97.7 F | OXYGEN SATURATION: 91 % | RESPIRATION RATE: 16 BRPM | WEIGHT: 140 LBS | HEART RATE: 98 BPM

## 2018-11-05 DIAGNOSIS — J43.0 SWYER-JAMES SYNDROME (HCC): ICD-10-CM

## 2018-11-05 DIAGNOSIS — J96.11 CHRONIC HYPOXEMIC RESPIRATORY FAILURE (HCC): Primary | ICD-10-CM

## 2018-11-05 PROCEDURE — G8482 FLU IMMUNIZE ORDER/ADMIN: HCPCS | Performed by: INTERNAL MEDICINE

## 2018-11-05 PROCEDURE — 4040F PNEUMOC VAC/ADMIN/RCVD: CPT | Performed by: INTERNAL MEDICINE

## 2018-11-05 PROCEDURE — 1036F TOBACCO NON-USER: CPT | Performed by: INTERNAL MEDICINE

## 2018-11-05 PROCEDURE — 1090F PRES/ABSN URINE INCON ASSESS: CPT | Performed by: INTERNAL MEDICINE

## 2018-11-05 PROCEDURE — G8598 ASA/ANTIPLAT THER USED: HCPCS | Performed by: INTERNAL MEDICINE

## 2018-11-05 PROCEDURE — 3023F SPIROM DOC REV: CPT | Performed by: INTERNAL MEDICINE

## 2018-11-05 PROCEDURE — 1123F ACP DISCUSS/DSCN MKR DOCD: CPT | Performed by: INTERNAL MEDICINE

## 2018-11-05 PROCEDURE — 99212 OFFICE O/P EST SF 10 MIN: CPT | Performed by: INTERNAL MEDICINE

## 2018-11-05 PROCEDURE — G8420 CALC BMI NORM PARAMETERS: HCPCS | Performed by: INTERNAL MEDICINE

## 2018-11-05 PROCEDURE — 1101F PT FALLS ASSESS-DOCD LE1/YR: CPT | Performed by: INTERNAL MEDICINE

## 2018-11-05 PROCEDURE — G8926 SPIRO NO PERF OR DOC: HCPCS | Performed by: INTERNAL MEDICINE

## 2018-11-05 PROCEDURE — G8427 DOCREV CUR MEDS BY ELIG CLIN: HCPCS | Performed by: INTERNAL MEDICINE

## 2018-12-12 ENCOUNTER — TELEPHONE (OUTPATIENT)
Dept: FAMILY MEDICINE CLINIC | Age: 83
End: 2018-12-12

## 2018-12-12 DIAGNOSIS — I10 ESSENTIAL HYPERTENSION: ICD-10-CM

## 2018-12-12 DIAGNOSIS — E03.9 ACQUIRED HYPOTHYROIDISM: Primary | ICD-10-CM

## 2018-12-12 RX ORDER — HYDROCHLOROTHIAZIDE 25 MG/1
TABLET ORAL
Qty: 90 TABLET | Refills: 1 | Status: SHIPPED | OUTPATIENT
Start: 2018-12-12 | End: 2019-06-11 | Stop reason: SDUPTHER

## 2018-12-12 NOTE — TELEPHONE ENCOUNTER
Saud sadler/ Merclyub[facility] Sharpe jojo blood today and she will hold the blood tomorrow. Pt has an appt on 12-19-18 , pt is needing blood work prior to appt. Pl advise.

## 2018-12-13 DIAGNOSIS — I10 ESSENTIAL HYPERTENSION: ICD-10-CM

## 2018-12-13 DIAGNOSIS — E03.9 ACQUIRED HYPOTHYROIDISM: ICD-10-CM

## 2018-12-13 LAB
ANION GAP SERPL CALCULATED.3IONS-SCNC: 11 MMOL/L (ref 3–16)
BUN BLDV-MCNC: 14 MG/DL (ref 7–20)
CALCIUM SERPL-MCNC: 9.5 MG/DL (ref 8.3–10.6)
CHLORIDE BLD-SCNC: 100 MMOL/L (ref 99–110)
CO2: 28 MMOL/L (ref 21–32)
CREAT SERPL-MCNC: 0.6 MG/DL (ref 0.6–1.2)
GFR AFRICAN AMERICAN: >60
GFR NON-AFRICAN AMERICAN: >60
GLUCOSE BLD-MCNC: 79 MG/DL (ref 70–99)
POTASSIUM SERPL-SCNC: 4 MMOL/L (ref 3.5–5.1)
SODIUM BLD-SCNC: 139 MMOL/L (ref 136–145)
T4 FREE: 1.7 NG/DL (ref 0.9–1.8)
TSH SERPL DL<=0.05 MIU/L-ACNC: 0.17 UIU/ML (ref 0.27–4.2)

## 2018-12-19 ENCOUNTER — OFFICE VISIT (OUTPATIENT)
Dept: FAMILY MEDICINE CLINIC | Age: 83
End: 2018-12-19
Payer: MEDICARE

## 2018-12-19 VITALS
HEIGHT: 63 IN | TEMPERATURE: 97.3 F | HEART RATE: 76 BPM | DIASTOLIC BLOOD PRESSURE: 70 MMHG | WEIGHT: 139.2 LBS | BODY MASS INDEX: 24.66 KG/M2 | SYSTOLIC BLOOD PRESSURE: 124 MMHG

## 2018-12-19 DIAGNOSIS — I10 ESSENTIAL HYPERTENSION: Primary | ICD-10-CM

## 2018-12-19 DIAGNOSIS — E03.9 ACQUIRED HYPOTHYROIDISM: ICD-10-CM

## 2018-12-19 DIAGNOSIS — R79.89 ABNORMAL THYROID BLOOD TEST: ICD-10-CM

## 2018-12-19 PROCEDURE — 99213 OFFICE O/P EST LOW 20 MIN: CPT | Performed by: FAMILY MEDICINE

## 2018-12-19 PROCEDURE — 4040F PNEUMOC VAC/ADMIN/RCVD: CPT | Performed by: FAMILY MEDICINE

## 2018-12-19 PROCEDURE — 1036F TOBACCO NON-USER: CPT | Performed by: FAMILY MEDICINE

## 2018-12-19 PROCEDURE — G8482 FLU IMMUNIZE ORDER/ADMIN: HCPCS | Performed by: FAMILY MEDICINE

## 2018-12-19 PROCEDURE — G8427 DOCREV CUR MEDS BY ELIG CLIN: HCPCS | Performed by: FAMILY MEDICINE

## 2018-12-19 PROCEDURE — 1123F ACP DISCUSS/DSCN MKR DOCD: CPT | Performed by: FAMILY MEDICINE

## 2018-12-19 PROCEDURE — 1101F PT FALLS ASSESS-DOCD LE1/YR: CPT | Performed by: FAMILY MEDICINE

## 2018-12-19 PROCEDURE — G8420 CALC BMI NORM PARAMETERS: HCPCS | Performed by: FAMILY MEDICINE

## 2018-12-19 PROCEDURE — 1090F PRES/ABSN URINE INCON ASSESS: CPT | Performed by: FAMILY MEDICINE

## 2018-12-19 PROCEDURE — G8598 ASA/ANTIPLAT THER USED: HCPCS | Performed by: FAMILY MEDICINE

## 2018-12-19 ASSESSMENT — ENCOUNTER SYMPTOMS
ABDOMINAL PAIN: 0
DIARRHEA: 0
CONSTIPATION: 0
BLOOD IN STOOL: 0
SHORTNESS OF BREATH: 0

## 2018-12-19 NOTE — PROGRESS NOTES
Subjective:      Patient ID: Ana Murillo is a 80 y.o. female. Patient presents with:  6 Month Follow-Up: lipids, thyroid, hypertension - pt is fasting    Here to recheck and go over the blood work  She has no c/o  She did see the gi doctor    She did get the flu shot and she remains on O2 at night    YOB: 1928    Date of Visit:  12/19/2018    No Known Allergies    Current Outpatient Prescriptions:  hydrochlorothiazide (HYDRODIURIL) 25 MG tablet, TAKE 1 TABLET DAILY, Disp: 90 tablet, Rfl: 1  Multiple Vitamins-Minerals (PRESERVISION AREDS PO), Take by mouth 2 times daily, Disp: , Rfl:   amLODIPine (NORVASC) 5 MG tablet, TAKE 1 TABLET DAILY, Disp: 90 tablet, Rfl: 1  levothyroxine (SYNTHROID) 100 MCG tablet, TAKE 1 TABLET DAILY, Disp: 90 tablet, Rfl: 1  Mastectomy Bra MISC, by Does not apply route, Disp: 2 each, Rfl: 0  Breast Prosthesis MISC, by Does not apply route, Disp: 1 each, Rfl: 0  OXYGEN, Inhale 4 L into the lungs, Disp: , Rfl:   simvastatin (ZOCOR) 20 MG tablet, TAKE 1 TABLET EVERY EVENING, Disp: 90 tablet, Rfl: 3  Ascorbic Acid (VITAMIN C) 500 MG tablet, Take 500 mg by mouth daily, Disp: , Rfl:   Multiple Vitamins-Minerals (MULTIVITAL PO), Take  by mouth.  , Disp: , Rfl:      No current facility-administered medications for this visit.       ---------------------------------                  12/19/18 0906            ---------------------------------   BP:             124/70            Site:       Right Upper Arm       Position:        Sitting           Cuff Size:     Medium Adult         Pulse:            76              Temp:      97.3 °F (36.3 °C)      TempSrc:         Oral             Weight: 139 lb 3.2 oz (63.1 kg)   Height:      5' 3\" (1.6 m)       ---------------------------------  Body mass index is 24.66 kg/m².      Wt Readings from Last 3 Encounters:  12/19/18 : 139 lb 3.2 oz (63.1 kg)  11/05/18 : 140 lb (63.5

## 2018-12-26 RX ORDER — LEVOTHYROXINE SODIUM 0.1 MG/1
TABLET ORAL
Qty: 90 TABLET | Refills: 1 | Status: SHIPPED | OUTPATIENT
Start: 2018-12-26 | End: 2019-01-10 | Stop reason: SDUPTHER

## 2019-01-09 DIAGNOSIS — E03.9 ACQUIRED HYPOTHYROIDISM: ICD-10-CM

## 2019-01-09 DIAGNOSIS — R79.89 ABNORMAL THYROID BLOOD TEST: ICD-10-CM

## 2019-01-09 LAB
T4 FREE: 1.6 NG/DL (ref 0.9–1.8)
TSH SERPL DL<=0.05 MIU/L-ACNC: 0.2 UIU/ML (ref 0.27–4.2)

## 2019-01-10 RX ORDER — LEVOTHYROXINE SODIUM 88 UG/1
88 TABLET ORAL DAILY
Qty: 90 TABLET | Refills: 0 | Status: SHIPPED
Start: 2019-01-10 | End: 2019-07-16 | Stop reason: SDUPTHER

## 2019-06-18 ENCOUNTER — TELEPHONE (OUTPATIENT)
Dept: FAMILY MEDICINE CLINIC | Age: 84
End: 2019-06-18

## 2019-06-18 DIAGNOSIS — E03.9 ACQUIRED HYPOTHYROIDISM: ICD-10-CM

## 2019-06-18 DIAGNOSIS — I10 ESSENTIAL HYPERTENSION: Primary | ICD-10-CM

## 2019-06-19 NOTE — TELEPHONE ENCOUNTER
[phone]  Hipolito Bender advised that lab orders are in Epic and to be fasting at least 10-12 hours.

## 2019-07-01 RX ORDER — LEVOTHYROXINE SODIUM 88 UG/1
88 TABLET ORAL DAILY
Qty: 90 TABLET | Refills: 0 | Status: SHIPPED | OUTPATIENT
Start: 2019-07-01 | End: 2019-10-07 | Stop reason: SDUPTHER

## 2019-07-10 DIAGNOSIS — E03.9 ACQUIRED HYPOTHYROIDISM: ICD-10-CM

## 2019-07-10 DIAGNOSIS — I10 ESSENTIAL HYPERTENSION: ICD-10-CM

## 2019-07-10 LAB
A/G RATIO: 1 (ref 1.1–2.2)
ALBUMIN SERPL-MCNC: 3.9 G/DL (ref 3.4–5)
ALP BLD-CCNC: 84 U/L (ref 40–129)
ALT SERPL-CCNC: 11 U/L (ref 10–40)
ANION GAP SERPL CALCULATED.3IONS-SCNC: 15 MMOL/L (ref 3–16)
AST SERPL-CCNC: 20 U/L (ref 15–37)
BILIRUB SERPL-MCNC: 0.5 MG/DL (ref 0–1)
BUN BLDV-MCNC: 13 MG/DL (ref 7–20)
CALCIUM SERPL-MCNC: 10.1 MG/DL (ref 8.3–10.6)
CHLORIDE BLD-SCNC: 101 MMOL/L (ref 99–110)
CHOLESTEROL, TOTAL: 144 MG/DL (ref 0–199)
CO2: 25 MMOL/L (ref 21–32)
CREAT SERPL-MCNC: 0.6 MG/DL (ref 0.6–1.2)
GFR AFRICAN AMERICAN: >60
GFR NON-AFRICAN AMERICAN: >60
GLOBULIN: 4.1 G/DL
GLUCOSE BLD-MCNC: 79 MG/DL (ref 70–99)
HDLC SERPL-MCNC: 61 MG/DL (ref 40–60)
LDL CHOLESTEROL CALCULATED: 62 MG/DL
POTASSIUM SERPL-SCNC: 4.1 MMOL/L (ref 3.5–5.1)
SODIUM BLD-SCNC: 141 MMOL/L (ref 136–145)
T4 FREE: 1.4 NG/DL (ref 0.9–1.8)
TOTAL PROTEIN: 8 G/DL (ref 6.4–8.2)
TRIGL SERPL-MCNC: 107 MG/DL (ref 0–150)
TSH SERPL DL<=0.05 MIU/L-ACNC: 4.83 UIU/ML (ref 0.27–4.2)
VLDLC SERPL CALC-MCNC: 21 MG/DL

## 2019-07-16 ENCOUNTER — OFFICE VISIT (OUTPATIENT)
Dept: FAMILY MEDICINE CLINIC | Age: 84
End: 2019-07-16
Payer: MEDICARE

## 2019-07-16 VITALS
WEIGHT: 136 LBS | BODY MASS INDEX: 24.1 KG/M2 | HEIGHT: 63 IN | DIASTOLIC BLOOD PRESSURE: 64 MMHG | HEART RATE: 76 BPM | TEMPERATURE: 97.8 F | SYSTOLIC BLOOD PRESSURE: 114 MMHG

## 2019-07-16 DIAGNOSIS — R48.1 LOSS OF PERCEPTION FOR TASTE: ICD-10-CM

## 2019-07-16 DIAGNOSIS — E78.2 MIXED HYPERLIPIDEMIA: ICD-10-CM

## 2019-07-16 DIAGNOSIS — I10 ESSENTIAL HYPERTENSION: ICD-10-CM

## 2019-07-16 DIAGNOSIS — E03.9 ACQUIRED HYPOTHYROIDISM: ICD-10-CM

## 2019-07-16 DIAGNOSIS — I10 ESSENTIAL HYPERTENSION: Primary | ICD-10-CM

## 2019-07-16 LAB
FOLATE: >20 NG/ML (ref 4.78–24.2)
VITAMIN B-12: 603 PG/ML (ref 211–911)

## 2019-07-16 PROCEDURE — G8420 CALC BMI NORM PARAMETERS: HCPCS | Performed by: FAMILY MEDICINE

## 2019-07-16 PROCEDURE — 1090F PRES/ABSN URINE INCON ASSESS: CPT | Performed by: FAMILY MEDICINE

## 2019-07-16 PROCEDURE — 1036F TOBACCO NON-USER: CPT | Performed by: FAMILY MEDICINE

## 2019-07-16 PROCEDURE — 99214 OFFICE O/P EST MOD 30 MIN: CPT | Performed by: FAMILY MEDICINE

## 2019-07-16 PROCEDURE — G8427 DOCREV CUR MEDS BY ELIG CLIN: HCPCS | Performed by: FAMILY MEDICINE

## 2019-07-16 PROCEDURE — G8599 NO ASA/ANTIPLAT THER USE RNG: HCPCS | Performed by: FAMILY MEDICINE

## 2019-07-16 PROCEDURE — 4040F PNEUMOC VAC/ADMIN/RCVD: CPT | Performed by: FAMILY MEDICINE

## 2019-07-16 PROCEDURE — 1123F ACP DISCUSS/DSCN MKR DOCD: CPT | Performed by: FAMILY MEDICINE

## 2019-07-16 ASSESSMENT — ENCOUNTER SYMPTOMS
DIARRHEA: 0
TROUBLE SWALLOWING: 0
VOMITING: 0
VOICE CHANGE: 0
SORE THROAT: 0
ABDOMINAL PAIN: 0
CONSTIPATION: 0
BLOOD IN STOOL: 0

## 2019-07-16 ASSESSMENT — PATIENT HEALTH QUESTIONNAIRE - PHQ9
2. FEELING DOWN, DEPRESSED OR HOPELESS: 0
SUM OF ALL RESPONSES TO PHQ QUESTIONS 1-9: 0
1. LITTLE INTEREST OR PLEASURE IN DOING THINGS: 0
SUM OF ALL RESPONSES TO PHQ QUESTIONS 1-9: 0
SUM OF ALL RESPONSES TO PHQ9 QUESTIONS 1 & 2: 0

## 2019-09-03 RX ORDER — HYDROCHLOROTHIAZIDE 25 MG/1
TABLET ORAL
Qty: 90 TABLET | Refills: 0 | Status: SHIPPED | OUTPATIENT
Start: 2019-09-03 | End: 2019-11-26 | Stop reason: SDUPTHER

## 2019-09-17 ENCOUNTER — HOSPITAL ENCOUNTER (OUTPATIENT)
Dept: WOMENS IMAGING | Age: 84
Discharge: HOME | End: 2019-09-17
Payer: MEDICARE

## 2019-09-17 DIAGNOSIS — Z12.39 BREAST CANCER SCREENING: ICD-10-CM

## 2019-09-17 PROCEDURE — 77063 BREAST TOMOSYNTHESIS BI: CPT

## 2019-09-17 RX ORDER — AMLODIPINE BESYLATE 5 MG/1
TABLET ORAL
Qty: 90 TABLET | Refills: 1 | Status: SHIPPED | OUTPATIENT
Start: 2019-09-17 | End: 2020-03-21

## 2019-10-07 ENCOUNTER — OFFICE VISIT (OUTPATIENT)
Dept: FAMILY MEDICINE CLINIC | Age: 84
End: 2019-10-07
Payer: MEDICARE

## 2019-10-07 VITALS
SYSTOLIC BLOOD PRESSURE: 122 MMHG | HEART RATE: 76 BPM | TEMPERATURE: 97.6 F | BODY MASS INDEX: 23.57 KG/M2 | WEIGHT: 133 LBS | HEIGHT: 63 IN | DIASTOLIC BLOOD PRESSURE: 64 MMHG

## 2019-10-07 DIAGNOSIS — Z85.3 PERSONAL HISTORY OF MALIGNANT NEOPLASM OF BREAST: ICD-10-CM

## 2019-10-07 DIAGNOSIS — E03.9 ACQUIRED HYPOTHYROIDISM: ICD-10-CM

## 2019-10-07 DIAGNOSIS — Z02.9 ADMINISTRATIVE ENCOUNTER: Primary | ICD-10-CM

## 2019-10-07 DIAGNOSIS — J44.9 CHRONIC OBSTRUCTIVE PULMONARY DISEASE, UNSPECIFIED COPD TYPE (HCC): ICD-10-CM

## 2019-10-07 DIAGNOSIS — I10 ESSENTIAL HYPERTENSION: ICD-10-CM

## 2019-10-07 DIAGNOSIS — J96.11 CHRONIC HYPOXEMIC RESPIRATORY FAILURE (HCC): ICD-10-CM

## 2019-10-07 DIAGNOSIS — E78.2 MIXED HYPERLIPIDEMIA: ICD-10-CM

## 2019-10-07 PROCEDURE — G8484 FLU IMMUNIZE NO ADMIN: HCPCS | Performed by: FAMILY MEDICINE

## 2019-10-07 PROCEDURE — 1123F ACP DISCUSS/DSCN MKR DOCD: CPT | Performed by: FAMILY MEDICINE

## 2019-10-07 PROCEDURE — G8420 CALC BMI NORM PARAMETERS: HCPCS | Performed by: FAMILY MEDICINE

## 2019-10-07 PROCEDURE — G8926 SPIRO NO PERF OR DOC: HCPCS | Performed by: FAMILY MEDICINE

## 2019-10-07 PROCEDURE — 99214 OFFICE O/P EST MOD 30 MIN: CPT | Performed by: FAMILY MEDICINE

## 2019-10-07 PROCEDURE — 3023F SPIROM DOC REV: CPT | Performed by: FAMILY MEDICINE

## 2019-10-07 PROCEDURE — 1090F PRES/ABSN URINE INCON ASSESS: CPT | Performed by: FAMILY MEDICINE

## 2019-10-07 PROCEDURE — 1036F TOBACCO NON-USER: CPT | Performed by: FAMILY MEDICINE

## 2019-10-07 PROCEDURE — 4040F PNEUMOC VAC/ADMIN/RCVD: CPT | Performed by: FAMILY MEDICINE

## 2019-10-07 PROCEDURE — G8427 DOCREV CUR MEDS BY ELIG CLIN: HCPCS | Performed by: FAMILY MEDICINE

## 2019-10-07 PROCEDURE — G8599 NO ASA/ANTIPLAT THER USE RNG: HCPCS | Performed by: FAMILY MEDICINE

## 2019-10-07 RX ORDER — LEVOTHYROXINE SODIUM 88 UG/1
88 TABLET ORAL DAILY
Qty: 90 TABLET | Refills: 1 | Status: SHIPPED | OUTPATIENT
Start: 2019-10-07

## 2019-10-07 ASSESSMENT — ENCOUNTER SYMPTOMS
ABDOMINAL DISTENTION: 0
TROUBLE SWALLOWING: 0
SHORTNESS OF BREATH: 0
ABDOMINAL PAIN: 0
EYE PAIN: 0
CHEST TIGHTNESS: 0
SORE THROAT: 0
CONSTIPATION: 0
DIARRHEA: 0
ROS SKIN COMMENTS: NO LESIONS
VOICE CHANGE: 0
COUGH: 0
BACK PAIN: 0
BLOOD IN STOOL: 0
VOMITING: 0
NAUSEA: 0

## 2019-11-19 ENCOUNTER — OFFICE VISIT (OUTPATIENT)
Dept: PULMONOLOGY | Age: 84
End: 2019-11-19
Payer: MEDICARE

## 2019-11-19 VITALS
HEART RATE: 84 BPM | SYSTOLIC BLOOD PRESSURE: 110 MMHG | BODY MASS INDEX: 23.74 KG/M2 | DIASTOLIC BLOOD PRESSURE: 60 MMHG | WEIGHT: 134 LBS | HEIGHT: 63 IN | RESPIRATION RATE: 16 BRPM | TEMPERATURE: 97.7 F | OXYGEN SATURATION: 92 %

## 2019-11-19 DIAGNOSIS — J43.0 SWYER-JAMES SYNDROME (HCC): ICD-10-CM

## 2019-11-19 DIAGNOSIS — J96.11 CHRONIC HYPOXEMIC RESPIRATORY FAILURE (HCC): ICD-10-CM

## 2019-11-19 PROCEDURE — 99212 OFFICE O/P EST SF 10 MIN: CPT | Performed by: INTERNAL MEDICINE

## 2019-11-19 PROCEDURE — G8926 SPIRO NO PERF OR DOC: HCPCS | Performed by: INTERNAL MEDICINE

## 2019-11-19 PROCEDURE — 1090F PRES/ABSN URINE INCON ASSESS: CPT | Performed by: INTERNAL MEDICINE

## 2019-11-19 PROCEDURE — 4040F PNEUMOC VAC/ADMIN/RCVD: CPT | Performed by: INTERNAL MEDICINE

## 2019-11-19 PROCEDURE — G8599 NO ASA/ANTIPLAT THER USE RNG: HCPCS | Performed by: INTERNAL MEDICINE

## 2019-11-19 PROCEDURE — 1036F TOBACCO NON-USER: CPT | Performed by: INTERNAL MEDICINE

## 2019-11-19 PROCEDURE — 1123F ACP DISCUSS/DSCN MKR DOCD: CPT | Performed by: INTERNAL MEDICINE

## 2019-11-19 PROCEDURE — G8427 DOCREV CUR MEDS BY ELIG CLIN: HCPCS | Performed by: INTERNAL MEDICINE

## 2019-11-19 PROCEDURE — G8420 CALC BMI NORM PARAMETERS: HCPCS | Performed by: INTERNAL MEDICINE

## 2019-11-19 PROCEDURE — G8482 FLU IMMUNIZE ORDER/ADMIN: HCPCS | Performed by: INTERNAL MEDICINE

## 2019-11-19 PROCEDURE — 3023F SPIROM DOC REV: CPT | Performed by: INTERNAL MEDICINE

## 2019-11-26 RX ORDER — HYDROCHLOROTHIAZIDE 25 MG/1
TABLET ORAL
Qty: 90 TABLET | Refills: 0 | Status: SHIPPED | OUTPATIENT
Start: 2019-11-26 | End: 2020-02-28

## 2020-01-07 ENCOUNTER — TELEPHONE (OUTPATIENT)
Dept: FAMILY MEDICINE CLINIC | Age: 85
End: 2020-01-07

## 2020-01-07 NOTE — TELEPHONE ENCOUNTER
[phone] ([place])  - Aster Landon advised that lab orders are in Epic and to be fasting at least 10-12 hours.

## 2020-01-09 RX ORDER — SIMVASTATIN 20 MG
TABLET ORAL
Qty: 90 TABLET | Refills: 2 | Status: SHIPPED | OUTPATIENT
Start: 2020-01-09

## 2020-01-10 DIAGNOSIS — E03.9 ACQUIRED HYPOTHYROIDISM: ICD-10-CM

## 2020-01-10 DIAGNOSIS — I10 ESSENTIAL HYPERTENSION: ICD-10-CM

## 2020-01-10 LAB
A/G RATIO: 1 (ref 1.1–2.2)
ALBUMIN SERPL-MCNC: 3.8 G/DL (ref 3.4–5)
ALP BLD-CCNC: 81 U/L (ref 40–129)
ALT SERPL-CCNC: 6 U/L (ref 10–40)
ANION GAP SERPL CALCULATED.3IONS-SCNC: 14 MMOL/L (ref 3–16)
AST SERPL-CCNC: 23 U/L (ref 15–37)
BILIRUB SERPL-MCNC: 0.5 MG/DL (ref 0–1)
BUN BLDV-MCNC: 17 MG/DL (ref 7–20)
CALCIUM SERPL-MCNC: 9.9 MG/DL (ref 8.3–10.6)
CHLORIDE BLD-SCNC: 103 MMOL/L (ref 99–110)
CHOLESTEROL, TOTAL: 135 MG/DL (ref 0–199)
CO2: 26 MMOL/L (ref 21–32)
CREAT SERPL-MCNC: 0.6 MG/DL (ref 0.6–1.2)
GFR AFRICAN AMERICAN: >60
GFR NON-AFRICAN AMERICAN: >60
GLOBULIN: 3.8 G/DL
GLUCOSE BLD-MCNC: 84 MG/DL (ref 70–99)
POTASSIUM SERPL-SCNC: 4 MMOL/L (ref 3.5–5.1)
SODIUM BLD-SCNC: 143 MMOL/L (ref 136–145)
T4 FREE: 1.4 NG/DL (ref 0.9–1.8)
TOTAL PROTEIN: 7.6 G/DL (ref 6.4–8.2)
TSH SERPL DL<=0.05 MIU/L-ACNC: 2.45 UIU/ML (ref 0.27–4.2)

## 2020-01-14 ENCOUNTER — OFFICE VISIT (OUTPATIENT)
Dept: FAMILY MEDICINE CLINIC | Age: 85
End: 2020-01-14
Payer: MEDICARE

## 2020-01-14 VITALS
DIASTOLIC BLOOD PRESSURE: 70 MMHG | BODY MASS INDEX: 23.39 KG/M2 | TEMPERATURE: 97.4 F | SYSTOLIC BLOOD PRESSURE: 118 MMHG | HEART RATE: 68 BPM | HEIGHT: 63 IN | WEIGHT: 132 LBS

## 2020-01-14 PROCEDURE — G8482 FLU IMMUNIZE ORDER/ADMIN: HCPCS | Performed by: FAMILY MEDICINE

## 2020-01-14 PROCEDURE — 4040F PNEUMOC VAC/ADMIN/RCVD: CPT | Performed by: FAMILY MEDICINE

## 2020-01-14 PROCEDURE — 1090F PRES/ABSN URINE INCON ASSESS: CPT | Performed by: FAMILY MEDICINE

## 2020-01-14 PROCEDURE — 1123F ACP DISCUSS/DSCN MKR DOCD: CPT | Performed by: FAMILY MEDICINE

## 2020-01-14 PROCEDURE — G8427 DOCREV CUR MEDS BY ELIG CLIN: HCPCS | Performed by: FAMILY MEDICINE

## 2020-01-14 PROCEDURE — G8420 CALC BMI NORM PARAMETERS: HCPCS | Performed by: FAMILY MEDICINE

## 2020-01-14 PROCEDURE — 1036F TOBACCO NON-USER: CPT | Performed by: FAMILY MEDICINE

## 2020-01-14 PROCEDURE — 99213 OFFICE O/P EST LOW 20 MIN: CPT | Performed by: FAMILY MEDICINE

## 2020-01-14 ASSESSMENT — PATIENT HEALTH QUESTIONNAIRE - PHQ9
1. LITTLE INTEREST OR PLEASURE IN DOING THINGS: 0
SUM OF ALL RESPONSES TO PHQ9 QUESTIONS 1 & 2: 0
SUM OF ALL RESPONSES TO PHQ QUESTIONS 1-9: 0
2. FEELING DOWN, DEPRESSED OR HOPELESS: 0
SUM OF ALL RESPONSES TO PHQ QUESTIONS 1-9: 0

## 2020-01-14 ASSESSMENT — ENCOUNTER SYMPTOMS
CHEST TIGHTNESS: 0
BLOOD IN STOOL: 0
CONSTIPATION: 0
ABDOMINAL PAIN: 0
VOMITING: 0
NAUSEA: 0
SHORTNESS OF BREATH: 0
ABDOMINAL DISTENTION: 0
DIARRHEA: 0

## 2020-01-14 NOTE — PROGRESS NOTES
night  Will leave the appt book open as she is relocating       Plan:      Do continue the current medicines  Call for any problem        Maya Hatfield MD

## 2020-02-28 RX ORDER — HYDROCHLOROTHIAZIDE 25 MG/1
TABLET ORAL
Qty: 90 TABLET | Refills: 0 | Status: SHIPPED | OUTPATIENT
Start: 2020-02-28 | End: 2020-05-27

## 2020-03-21 RX ORDER — AMLODIPINE BESYLATE 5 MG/1
TABLET ORAL
Qty: 90 TABLET | Refills: 1 | Status: SHIPPED | OUTPATIENT
Start: 2020-03-21

## 2020-06-04 ENCOUNTER — TELEPHONE (OUTPATIENT)
Dept: PULMONOLOGY | Age: 85
End: 2020-06-04

## 2023-06-22 NOTE — TELEPHONE ENCOUNTER
Pt has an appt on  Jan. 14, 2020 for a 6 mo HTN f/u , pt is needing blood work prior to appt. Pl advise.  [phone] chest pain chest pain chest pain

## 2023-11-21 NOTE — PROGRESS NOTES
Daily Progress Note   Michelle Conway MD      1/19/2018    Chief Complaint   Patient presents with    New Patient     Pt is here today, referred by Dr Marisa Villegas, for stenosis of right vertebral artery. CT Angio performed on 11/17. HISTORY OF PRESENT ILLNESS:                The patient is a 80 y.o. female who presents with a consultation per referral from Dr. Marisa Villegas for Vertebral Artery Stenosis. Pt states she does not use Oxygen while at home, she did not arrive to this apt with use of Oxygen. She also indicates she had an episode of dizziness while sleeping, she states she encountered the dizziness when she had turned over while laying in bed. She does experience SOB, when she would walk a  Distance 1 block, she states she has not been advised that this would be due to past Hx of smoking. She has been prescribed several inhalers as therapeutic measures for her symptoms of SOB. Without relief  She has no Hx of Cardiac complications, she does have Hx of Hypertension, as well as Hyperlipidemia. She does not express pain to the lower extremities. She does have Eczema to the lower extremities, she denies any major complications of this. She denies any signs of symptoms of Stroke or TIA's in the past.     She has undergone a Mastectomy 52 years ago, with lymph nodes being involved, being highly inflamed but not cancerous. She states this was performed by Dr. Delbra Bence. She also mentions that she has had her ovaries removed.     Past Medical History:   Diagnosis Date    Abnormal EKG     Atrial arrhythmia     Echo 09 LVEF 65-70%    Breast cancer (HCC)     s/p mastectomy    COPD (chronic obstructive pulmonary disease) (HCC)     Hyperlipidemia     managed by PCP    Hypothyroidism     managed by PCP    Preop cardiovascular exam     prior to having cataract surgery       Past Surgical History:   Procedure Laterality Date    MASTECTOMY      OVARY REMOVAL      TONSILLECTOMY AND ADENOIDECTOMY Social History     Social History    Marital status:      Spouse name: N/A    Number of children: N/A    Years of education: N/A     Occupational History    Not on file. Social History Main Topics    Smoking status: Former Smoker     Packs/day: 1.00     Years: 20.00     Quit date: 12/1/1992    Smokeless tobacco: Never Used    Alcohol use Yes      Comment: occ    Drug use: No    Sexual activity: Not on file     Other Topics Concern    Not on file     Social History Narrative    No narrative on file       Family History   Problem Relation Age of Onset    Diabetes Sister     Heart Disease Sister     Heart Disease Brother          Current Outpatient Prescriptions:     Fluticasone Furoate-Vilanterol (BREO ELLIPTA) 200-25 MCG/INH AEPB, Inhale 200 mcg into the lungs daily, Disp: 1 each, Rfl: 0    levothyroxine (SYNTHROID) 100 MCG tablet, TAKE 1 TABLET DAILY, Disp: 90 tablet, Rfl: 3    amLODIPine (NORVASC) 5 MG tablet, TAKE 1 TABLET DAILY, Disp: 90 tablet, Rfl: 3    hydrochlorothiazide (HYDRODIURIL) 25 MG tablet, TAKE 1 TABLET DAILY, Disp: 90 tablet, Rfl: 3    simvastatin (ZOCOR) 20 MG tablet, TAKE 1 TABLET EVERY EVENING, Disp: 90 tablet, Rfl: 3    aspirin 81 MG tablet, Take 81 mg by mouth daily, Disp: , Rfl:     Ascorbic Acid (VITAMIN C) 500 MG tablet, Take 500 mg by mouth daily, Disp: , Rfl:     Multiple Minerals-Vitamins (CALCIUM & VIT D3 BONE HEALTH PO), Take by mouth daily, Disp: , Rfl:     Multiple Vitamins-Minerals (MULTIVITAL PO), Take  by mouth.  , Disp: , Rfl:     Review of patient's allergies indicates no known allergies.     Vitals:    01/19/18 1255   BP: 138/80   Weight: 154 lb (69.9 kg)   Height: 5' 3\" (1.6 m)       Orders Only on 12/01/2017   Component Date Value Ref Range Status    TSH 12/01/2017 1.73  0.27 - 4.20 uIU/mL Final    Cholesterol, Total 12/01/2017 153  0 - 199 mg/dL Final    Triglycerides 12/01/2017 106  0 - 150 mg/dL Final    HDL 12/01/2017 70* 40 - 60 mg/dL Final    LDL Calculated 12/01/2017 62  <100 mg/dL Final    VLDL Cholesterol Calculated 12/01/2017 21  Not Established mg/dL Final    Sodium 12/01/2017 143  136 - 145 mmol/L Final    Potassium 12/01/2017 3.6  3.5 - 5.1 mmol/L Final    Chloride 12/01/2017 102  99 - 110 mmol/L Final    CO2 12/01/2017 27  21 - 32 mmol/L Final    Anion Gap 12/01/2017 14  3 - 16 Final    Glucose 12/01/2017 93  70 - 99 mg/dL Final    BUN 12/01/2017 11  7 - 20 mg/dL Final    CREATININE 12/01/2017 0.5* 0.6 - 1.2 mg/dL Final    GFR Non- 12/01/2017 >60  >60 Final    Comment: >60 mL/min/1.73m2 EGFR, calc. for ages 25 and older using the  MDRD formula (not corrected for weight), is valid for stable  renal function.  GFR  12/01/2017 >60  >60 Final    Comment: Chronic Kidney Disease: less than 60 ml/min/1.73 sq.m. Kidney Failure: less than 15 ml/min/1.73 sq.m. Results valid for patients 18 years and older.  Calcium 12/01/2017 9.9  8.3 - 10.6 mg/dL Final    Total Protein 12/01/2017 7.7  6.4 - 8.2 g/dL Final    Alb 12/01/2017 4.0  3.4 - 5.0 g/dL Final    Albumin/Globulin Ratio 12/01/2017 1.1  1.1 - 2.2 Final    Total Bilirubin 12/01/2017 0.8  0.0 - 1.0 mg/dL Final    Alkaline Phosphatase 12/01/2017 145* 40 - 129 U/L Final    ALT 12/01/2017 20  10 - 40 U/L Final    AST 12/01/2017 26  15 - 37 U/L Final    Globulin 12/01/2017 3.7  g/dL Final    Vit D, 25-Hydroxy 12/01/2017 51.3  >=30 ng/mL Final    Comment: <=20 ng/mL. ........... Aditi Ahmet Deficient  21-29 ng/mL. ......... Aditi Ahmet Insufficient  >=30 ng/mL. ........ Aditi Dashuton Sufficient         Review of Systems   Constitutional: Negative. HENT: Negative. Eyes: Positive for visual disturbance. Negative for photophobia, pain, discharge, redness and itching. Respiratory: Negative. Cardiovascular: Negative. Gastrointestinal: Negative. Endocrine: Negative. Genitourinary: Negative. Musculoskeletal: Positive for gait problem.  Negative for arthralgias, back pain, joint swelling, myalgias, neck pain and neck stiffness. Skin: Negative. Allergic/Immunologic: Negative. Hematological: Negative. Psychiatric/Behavioral: Negative. All other systems reviewed and are negative. Physical Exam   Constitutional: She is oriented to person, place, and time. Vital signs are normal. She appears well-developed and well-nourished. Non-toxic appearance. She does not have a sickly appearance. She does not appear ill. No distress. HENT:   Head: Normocephalic and atraumatic. Right Ear: External ear normal.   Left Ear: External ear normal.   Eyes: Pupils are equal, round, and reactive to light. No scleral icterus. Neck: Normal range of motion. Neck supple. Normal carotid pulses and no JVD present. Carotid bruit is not present. No tracheal deviation, no edema and no erythema present. No thyromegaly present. Cardiovascular: Normal rate, regular rhythm, S1 normal, normal heart sounds and intact distal pulses. No murmur heard. Pulses:       Carotid pulses are 2+ on the right side, and 2+ on the left side. Femoral pulses are 2+ on the right side, and 2+ on the left side. Dorsalis pedis pulses are 2+ on the right side, and 1+ on the left side. Posterior tibial pulses are 1+ on the right side, and 1+ on the left side. Pulmonary/Chest: Effort normal and breath sounds normal. No accessory muscle usage or stridor. No tachypnea. No respiratory distress. She has no wheezes. She has no rales. Abdominal: Soft. Bowel sounds are normal. She exhibits no shifting dullness, no distension, no abdominal bruit, no ascites and no mass. There is no hepatosplenomegaly. There is no tenderness. There is no rebound, no guarding and no CVA tenderness. No hernia. Hernia confirmed negative in the ventral area, confirmed negative in the right inguinal area and confirmed negative in the left inguinal area.        Genitourinary:   Genitourinary advised that no surgical procedure is not recommended at this time. Rt vert stenosis at the origin and along the vessel, should give her any symptoms,her two carotids and the Lt vert (large) would give plenty of collateral thru the [place]    Advised the patient that her symptoms reflect a possible middle ear issue. Return if symptoms worsen or fail to improve. I Ivonne Garsia MA am scribing for and in the presence of Kate Muñoz MD on this date of 01/19/18    I Rexford Boas, MD personally performed the services described in this documentation as scribed by the Medical Assistant Ivonne Garsia  in my presence and it is both accurate and complete.         Electronically signed by Kate Muñoz MD on 1/19/2018 at 1:16 PM No